# Patient Record
Sex: FEMALE | Race: WHITE | NOT HISPANIC OR LATINO | Employment: UNEMPLOYED | URBAN - METROPOLITAN AREA
[De-identification: names, ages, dates, MRNs, and addresses within clinical notes are randomized per-mention and may not be internally consistent; named-entity substitution may affect disease eponyms.]

---

## 2021-05-06 ENCOUNTER — PATIENT MESSAGE (OUTPATIENT)
Dept: RESEARCH | Facility: HOSPITAL | Age: 46
End: 2021-05-06

## 2021-05-19 ENCOUNTER — OFFICE VISIT (OUTPATIENT)
Dept: URGENT CARE | Facility: CLINIC | Age: 46
End: 2021-05-19
Payer: MEDICAID

## 2021-05-19 VITALS
WEIGHT: 150 LBS | TEMPERATURE: 99 F | HEART RATE: 86 BPM | DIASTOLIC BLOOD PRESSURE: 83 MMHG | BODY MASS INDEX: 24.99 KG/M2 | RESPIRATION RATE: 15 BRPM | HEIGHT: 65 IN | SYSTOLIC BLOOD PRESSURE: 126 MMHG | OXYGEN SATURATION: 98 %

## 2021-05-19 DIAGNOSIS — H00.025 HORDEOLUM INTERNUM OF LEFT LOWER EYELID: Primary | ICD-10-CM

## 2021-05-19 PROCEDURE — 99203 OFFICE O/P NEW LOW 30 MIN: CPT | Mod: S$GLB,,, | Performed by: NURSE PRACTITIONER

## 2021-05-19 PROCEDURE — 99203 PR OFFICE/OUTPT VISIT, NEW, LEVL III, 30-44 MIN: ICD-10-PCS | Mod: S$GLB,,, | Performed by: NURSE PRACTITIONER

## 2021-05-19 RX ORDER — OFLOXACIN 3 MG/ML
1 SOLUTION/ DROPS OPHTHALMIC 4 TIMES DAILY
Qty: 5 ML | Refills: 0 | Status: SHIPPED | OUTPATIENT
Start: 2021-05-19 | End: 2021-05-29

## 2022-10-25 PROBLEM — N94.6 SEVERE DYSMENORRHEA: Status: ACTIVE | Noted: 2022-10-25

## 2024-01-10 DIAGNOSIS — M79.641 RIGHT HAND PAIN: Primary | ICD-10-CM

## 2024-01-10 NOTE — PROGRESS NOTES
We received referral for patient to be seen for right hand pain. She agreed to appointment on 2/15/23 at 11:00 AM. Xrays are ordered, pt aware to go to hospital 30 minutes prior to appointment for xrays.

## 2024-03-27 ENCOUNTER — OFFICE VISIT (OUTPATIENT)
Dept: ORTHOPEDICS | Facility: CLINIC | Age: 49
End: 2024-03-27
Payer: MEDICAID

## 2024-03-27 ENCOUNTER — HOSPITAL ENCOUNTER (OUTPATIENT)
Dept: RADIOLOGY | Facility: HOSPITAL | Age: 49
Discharge: HOME | End: 2024-03-27
Attending: PHYSICIAN ASSISTANT
Payer: MEDICAID

## 2024-03-27 VITALS
BODY MASS INDEX: 26.38 KG/M2 | HEIGHT: 65 IN | DIASTOLIC BLOOD PRESSURE: 84 MMHG | OXYGEN SATURATION: 99 % | WEIGHT: 158.31 LBS | SYSTOLIC BLOOD PRESSURE: 126 MMHG | RESPIRATION RATE: 16 BRPM

## 2024-03-27 DIAGNOSIS — M79.641 RIGHT HAND PAIN: Primary | ICD-10-CM

## 2024-03-27 DIAGNOSIS — M79.641 RIGHT HAND PAIN: ICD-10-CM

## 2024-03-27 DIAGNOSIS — M21.241 FLEXION DEFORMITY OF FINGER JOINT OF RIGHT HAND: ICD-10-CM

## 2024-03-27 PROCEDURE — 99213 OFFICE O/P EST LOW 20 MIN: CPT | Mod: PBBFAC,25 | Performed by: PHYSICIAN ASSISTANT

## 2024-03-27 PROCEDURE — 73130 X-RAY EXAM OF HAND: CPT | Mod: 26,RT,, | Performed by: RADIOLOGY

## 2024-03-27 PROCEDURE — 73130 X-RAY EXAM OF HAND: CPT | Mod: TC,RT

## 2024-03-27 PROCEDURE — 99999 PR PBB SHADOW E&M-EST. PATIENT-LVL III: CPT | Mod: PBBFAC,,, | Performed by: PHYSICIAN ASSISTANT

## 2024-03-27 PROCEDURE — 3074F SYST BP LT 130 MM HG: CPT | Mod: CPTII,,, | Performed by: PHYSICIAN ASSISTANT

## 2024-03-27 PROCEDURE — 1159F MED LIST DOCD IN RCRD: CPT | Mod: CPTII,,, | Performed by: PHYSICIAN ASSISTANT

## 2024-03-27 PROCEDURE — 3079F DIAST BP 80-89 MM HG: CPT | Mod: CPTII,,, | Performed by: PHYSICIAN ASSISTANT

## 2024-03-27 PROCEDURE — 99213 OFFICE O/P EST LOW 20 MIN: CPT | Mod: S$PBB,,, | Performed by: PHYSICIAN ASSISTANT

## 2024-03-27 PROCEDURE — 1160F RVW MEDS BY RX/DR IN RCRD: CPT | Mod: CPTII,,, | Performed by: PHYSICIAN ASSISTANT

## 2024-03-27 PROCEDURE — 3008F BODY MASS INDEX DOCD: CPT | Mod: CPTII,,, | Performed by: PHYSICIAN ASSISTANT

## 2024-03-27 RX ORDER — CETIRIZINE HYDROCHLORIDE 10 MG/1
10 TABLET ORAL
COMMUNITY
Start: 2023-11-29

## 2024-03-27 RX ORDER — ERGOCALCIFEROL 1.25 MG/1
50000 CAPSULE ORAL
COMMUNITY
Start: 2024-03-04

## 2024-03-27 NOTE — PROGRESS NOTES
Subjective:      Patient ID: Morelia Donis is a 48 y.o. female.    Chief Complaint: Pain of the Right Hand    Review of patient's allergies indicates:   Allergen Reactions    Bee sting [allergen ext-venom-honey bee] Swelling     Swelling of the Throat      47 yo F presents to clinic with c/o right hand pain and deformity x years.  No recent injury/trauma.  She reports that she fell in 2007 while holding glass baking dish, dish broke and lacerated her hand.  She reports that she severed several nerves and tendons and had surgery at [facility].  Since then she has had continued pain, numbness/tingling, and deformity to her hand.  She says that she has not had any treatment for this since 2007 when she completed OT.  She feels that her hand is getting stiffer and deformity getting worse.  Numbness/tingling to thumb, index finger, and middle finger, worse at night and when she wakes up in the morning.  Takes ibuprofen as needed with some relief of sx.  She says that she was told by [facility] that she would need additional surgery on her hand, asking for another opinion.        Review of Systems   Constitutional: Negative for chills, diaphoresis and fever.   HENT:  Negative for congestion, ear discharge and ear pain.    Eyes:  Negative for blurred vision, discharge, double vision and pain.   Cardiovascular:  Negative for chest pain, claudication and cyanosis.   Respiratory:  Negative for cough, hemoptysis and shortness of breath.    Endocrine: Negative for cold intolerance and heat intolerance.   Skin:  Negative for color change, dry skin, itching and rash.   Musculoskeletal:  Positive for joint pain. Negative for arthritis, back pain, falls, gout, joint swelling, muscle weakness and neck pain.   Gastrointestinal:  Negative for abdominal pain and change in bowel habit.   Neurological:  Positive for numbness and paresthesias. Negative for brief paralysis, disturbances in coordination and dizziness.    Psychiatric/Behavioral:  Negative for altered mental status and depression.          Objective:          General    Constitutional: She is oriented to person, place, and time. She appears well-developed and well-nourished. No distress.   HENT:   Head: Atraumatic.   Eyes: EOM are normal. Right eye exhibits no discharge. Left eye exhibits no discharge.   Cardiovascular:  Normal rate.            Pulmonary/Chest: Effort normal. No respiratory distress.   Abdominal: Soft.   Neurological: She is alert and oriented to person, place, and time.   Psychiatric: She has a normal mood and affect. Her behavior is normal.             Right Hand/Wrist Exam     Inspection   Scars: Wrist - absent Hand -  present  Effusion: Wrist - absent Hand -  absent  Bruising: Wrist - absent Hand -  absent  Deformity: Wrist - deformity Hand -  deformity    Range of Motion     Wrist   Extension:  abnormal   Flexion:  abnormal   Pronation:  normal   Supination:  normal     Tests   Phalens sign: positive  Tinel's sign (median nerve): positive  Finkelstein's test: negative  Carpal Tunnel Compression Test: positive  Cubital Tunnel Compression Test: negative      Other     Neuorologic Exam    Median Distribution: abnormal (decreased sensation compared to left)  Ulnar Distribution: normal  Radial Distribution: normal      Left Hand/Wrist Exam     Inspection   Scars: Wrist - absent Hand -  absent  Effusion: Wrist - absent Hand -  absent  Bruising: Wrist - absent Hand -  absent  Deformity: Wrist - absent Hand -  absent    Range of Motion     Wrist   Extension:  normal   Flexion:  normal   Pronation:  normal   Supination:  normal     Tests   Phalens Sign: negative  Tinel's sign (median nerve): negative  Finkelstein's test: negative  Carpal Tunnel Compression Test: negative  Cubital Tunnel Compression Test: negative      Other     Sensory Exam  Median Distribution: normal  Ulnar Distribution: normal  Radial Distribution: normal      Right Elbow Exam      Tests   Tinel's sign (cubital tunnel): negative      Left Elbow Exam     Tests   Tinel's sign (cubital tunnel): negative        Vascular Exam       Capillary Refill  Right Hand: normal capillary refill  Left Hand: normal capillary refill                  Assessment:         Xray Right Hand 3/27/24:  Evaluation of the digits is somewhat limited as the fingers are flexed. No displaced fractures identified. No evidence of lytic or blastic lesions.Joint spaces are unremarkable.Soft tissues are unremarkable.     Encounter Diagnoses   Name Primary?    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 Pt would like to proceed with OT and EMG at this time. She would also like to see Dr. Blair for a surgical second opinion.    EMG BUE.  Referral to OT.  Continue ibuprofen as directed as needed.  Appointment with Dr. Blair.    Patient voices understanding of and agreement with treatment plan. All of the patient's questions were answered, and the patient will contact us if she has any questions or concerns in the interim.

## 2024-04-08 ENCOUNTER — CLINICAL SUPPORT (OUTPATIENT)
Dept: REHABILITATION | Facility: HOSPITAL | Age: 49
End: 2024-04-08
Payer: MEDICAID

## 2024-04-08 DIAGNOSIS — R29.898 FINE MOTOR IMPAIRMENT: ICD-10-CM

## 2024-04-08 DIAGNOSIS — R29.818 FINE MOTOR IMPAIRMENT: ICD-10-CM

## 2024-04-08 DIAGNOSIS — M25.641 DECREASED RANGE OF MOTION OF FINGER OF RIGHT HAND: ICD-10-CM

## 2024-04-08 DIAGNOSIS — M79.641 RIGHT HAND PAIN: ICD-10-CM

## 2024-04-08 DIAGNOSIS — M21.241 FLEXION DEFORMITY OF FINGER JOINT OF RIGHT HAND: ICD-10-CM

## 2024-04-08 DIAGNOSIS — M79.641 HAND PAIN, RIGHT: Primary | ICD-10-CM

## 2024-04-08 DIAGNOSIS — R29.898 DECREASED GRIP STRENGTH: ICD-10-CM

## 2024-04-08 PROCEDURE — 97166 OT EVAL MOD COMPLEX 45 MIN: CPT | Mod: PN

## 2024-04-08 NOTE — PLAN OF CARE
"  [facility]  Occupational Therapy Initial Evaluation    Date: 4/8/2024  Name: Morelia Donis  Clinic Number: [id number]    Therapy Diagnosis:   Encounter Diagnoses   Name Primary?    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     Hand pain, right Yes    Decreased  strength     Fine motor impairment     Decreased range of motion of finger of right hand      Physician: Pauly Rivera PA-C    Physician Orders: OT Evaluate and treat  Medical Diagnosis: right hand pain and flexion contracture of right fingers  Surgical Procedure and Date: Flexor tendon repair   Evaluation Date: 4/8/2024  Insurance Authorization Period Expiration: 3/27/2024 - 3/27/2025  Plan of Care Certification Period: 4/8/2024 - 6/8/2024  Progress Note Due: 5/8/2024   Date of Return to MD: 5/22/2024  Visit # / Visits authorized: Evaluation  FOTO: See media section    Time In:10:15  Time Out: 11:00  Total Appointment Time (timed & untimed codes): 45 minutes    SUBJECTIVE     Date of Onset: 2008 - 2009    History of Current Condition/Mechanism of Injury: Morelia reports: She slipped on broken glass and landed on her right arm. Resultant laceration to Flexor tendons noted. She eventually had repair done at [facility] and participated in OT for ~1 year following surgery. She reports she did regain some function back to hand but never reached "100%" and being discharged from therapy due to meeting full potential. Over last several years Pt reports experiencing increased pain to hand with reduced range of motion to fingers. Pain largely at scar site at proximal hypothenar eminence with noticeable digit contracture. Increased ulnar drift of wrist also reported by Pt within last year. She reports currently having very limited functional use of right hand with hand randomly giving out during the day, frequently dropping things.     Falls: None    Involved Side: right   Dominant Side: Right  Imaging: " "X-ray     FINDINGS:  Evaluation of the digits is somewhat limited as the fingers are flexed.  No displaced fractures identified.  No evidence of lytic or blastic lesions.Joint spaces are unremarkable.Soft tissues are unremarkable.     Impression:     No evidence of fracture.No significant degenerative changes.    Prior Therapy: OT at [facility] in 2010 for ~1 year.  Occupation: Unemployed  Working presently: unemployed    Functional Limitations/Social History:    Previous functional status includes: Independent with all ADLs.     Current Functional Status   Home/Living environment: lives with their family      Limitation of Functional Status as follows:   ADLs/IADLs:     - Feeding: MOD I    - Bathing: MOD I    - Dressing/Grooming: MOD I    - Driving: MOD I    Pain:  Functional Pain Scale Rating 0-10: Current 7/10, worst 10/10, best 7/10   Location: Dorsal hand and digits of 4th and 5th. Numbness to 3rd digit. Constant pain noted to volar metacarpal phalangeal to hypothenar to proximal wrist.  Description: Aching, Dull, Burning, Sharp, and Shooting  Aggravating Factors: Night Time, Morning, Extension, Flexing, and Lifting  Easing Factors: pain medication    Patient's Goals for Therapy: "My hand usage back."    Medical History:   Past Medical History:   Diagnosis Date    Anxiety     Cervical cancer     Drug abuse     DUB (dysfunctional uterine bleeding)     Sinus drainage        Surgical History:    has a past surgical history that includes Tubal ligation; Clavicle surgery (Left); Tendon repair (Right); Cold knife conization of cervix; Repair of laceration (Right); Hysteroscopy with dilation and curettage of uterus (N/A, 10/25/2022); and Endometrial ablation (N/A, 10/25/2022).    Medications:   has a current medication list which includes the following prescription(s): albuterol, cetirizine, diazepam, ergocalciferol, ferrous sulfate, loratadine, multivitamin, and fish oil-omega-3 fatty acids.    Allergies:   Review of " patient's allergies indicates:   Allergen Reactions    Bee sting [allergen ext-venom-honey bee] Swelling     Swelling of the Throat          OBJECTIVE     Active Range of Motion:  (Measured in degrees)   Right Left   Wrist Extension  60 80   Wrist Flexion 80 80   Radial Deviation Neutral 35   Ulnar Deviation 60 (resting ulnar drift) 40     Range of Motion Hand  (Active/Passive)  Measured in degrees.   4/8/2024    Right       Digit 2:  MP  85/50 lag                 PIP     95/65                 DIP 40/neutral   Digit 3:  MP 85/50 lag                 PIP 90/50                 DIP 25/neutral   Digit 4:   MP 85/40 lag                  PIP 90/35                  DIP Neutral/neutral   Digit 5:  MP 85/25 lag                 PIP 65/35                 DIP 40/neutral       CMC:    MP 80/                 IP 65/     Manual Muscle Test   Right Left   Wrist Extension  5/5 5/5   Wrist Flexion 5/5 5/5   Radial Deviation 5/5 5/5   Ulnar Deviation 5/5 5/5        Strength (Dynamometer/Measured in pounds on Rung II) and Pinch Strength (Pinch Gauge)   4/8/2024 4/8/2024    Right Left   Composite 20 50   Lateral Pinch 4 13   Tripod Pinch 2 11     Opposition (Fine Motor Skills/Dexterity):   9 hole   - right: 32.83   -:left 18.23  - Reduced digit opposition and fine motor control due to limited finger extension and ulnar drift.     Wound/Scar management: Pt remains with noticeable scar tissue and adhesion with scar to palm moving proximally through hypothenar to ulnar side of wrist and into forearm.     Sensation: Able to consistently identify 3.61 filament to volar and dorsal bilateral upper extremity.       Sensation:   4/8/2024    Right   Deep pressure Intact   Light touch Intact       Edema. Measured in centimeters.   4/8/2024 4/8/2024    Right Left   Wrist Crease 16.3 16.5   Palm/hypothenar 18.7 20.5        Limitation/Restriction for FOTO Survey    Therapist reviewed FOTO scores for Morelia Donis on 4/8/2024.   FOTO  documents entered into EPIC - see Media section.    Limitation Score: See media section         Patient Education and Home Exercises      Education provided:     Patient/Family Education: role of OT, goals for OT, scheduling/cancellations - pt verbalized understanding. Discussed insurance limitations with patient.      ASSESSMENT     Morelia Donis is a 48 y.o. female referred to outpatient occupational therapy and presents with a medical diagnosis of right hand pain with flexion contracture of digits. Patient presents with the following therapy deficits: Decreased ROM, Decreased  strength, Decreased pinch strength, Decreased muscle strength, Decreased functional hand use, Increased pain, Joint Stiffness, and Scar Adhesions and demonstrates limitations as described in the chart below. Following medical record review it is determined that pt will benefit from occupational therapy services in order to maximize pain free and/or functional use of right hand. The following goals were discussed with the patient and patient is in agreement with them as to be addressed in the treatment plan. The patient's rehab potential is Fair.     Anticipated barriers to occupational therapy: None  Pt has no cultural, educational or language barriers to learning provided.    Profile and History Assessment of Occupational Performance Level of Clinical Decision Making Complexity Score   Occupational Profile:   Morelia Donis is a 48 y.o. female who lives with their family and is unemployed Morelia Donis has difficulty with  ADLs and IADLs as listed previously, which  Affecting herdaily functional abilities.      Comorbidities:    has a past medical history of Anxiety, Cervical cancer, Drug abuse, DUB (dysfunctional uterine bleeding), and Sinus drainage.    Medical and Therapy History Review:   Expanded               Performance Deficits    Physical:  Joint Mobility  Muscle Power/Strength  Skin Integrity/Scar  Formation  Control of Voluntary Movement   Strength  Pinch Strength  Fine Motor Coordination  Pain    Cognitive:  No Deficits    Psychosocial:    No Deficits     Clinical Decision Making:  moderate    Assessment Process:  Detailed Assessments    Modification/Need for Assistance:  Minimal-Moderate Modifications/Assistance    Intervention Selection:  Several Treatment Options       moderate  Based on PMHX, co morbidities , data from assessments and functional level of assistance required with task and clinical presentation directly impacting function.       The following goals were discussed with the patient and patient is in agreement with them as to be addressed in the treatment plan.       Goals:   The following goals were discussed with the patient and patient is in agreement with them as to be addressed in the treatment plan.     Short Term Goals (STGs); to be met within 4 weeks.   STG #1: Pt will report 7 out of 10 pain level at worst In right upper extremity.  STG #2: Pt will demonstrate increased extension ROM of 10 degrees to all digits in order increase functional use of UE  STG #3: Pt will demonstrate increased  strength of 10#s in order to increase fine motor skills and dexterity.  STG #4: Pt will complete the 9 peg strength test in order to improve fine motor dexterity with reduction in 5 seconds.   STG #5: Pt will demonstrate independence with issued HEP.     Long Term Goals (LTGs); to be met by discharge.  LTG #1: Pt will report a pain level of 4 out of 10 at worst in right upper extremity.   LTG #2: Pt will demo improved FOTO score to predicted level.  LTG #3: Pt will return to prior level of function for ADL.   LTG #4: Pt will demonstrate increased  strength of 15#s in order to increase fine motor skills and dexterity.  LTG #5: Pt will demonstrate increased extension ROM of 15 degrees to all digits in order increase functional use of UE  PLAN   Plan of Care Certification: 4/8/2024 to  6/8/2024.     Outpatient Occupational Therapy 1 - 2 times weekly for 8 weeks to include the following interventions: Paraffin, Fluidotherapy, Manual therapy/joint mobilizations, Modalities for pain management, US 3 mhz, Therapeutic exercises/activities., Strengthening, and Scar Management.      Nacho Castro OT      I CERTIFY THE NEED FOR THESE SERVICES FURNISHED UNDER THIS PLAN OF TREATMENT AND WHILE UNDER MY CARE  Physician's comments:      Physician's Signature: ___________________________________________________

## 2024-04-11 ENCOUNTER — CLINICAL SUPPORT (OUTPATIENT)
Dept: REHABILITATION | Facility: HOSPITAL | Age: 49
End: 2024-04-11
Payer: MEDICAID

## 2024-04-11 DIAGNOSIS — M79.641 HAND PAIN, RIGHT: Primary | ICD-10-CM

## 2024-04-11 DIAGNOSIS — R29.898 DECREASED GRIP STRENGTH: ICD-10-CM

## 2024-04-11 PROCEDURE — 97530 THERAPEUTIC ACTIVITIES: CPT | Mod: PN

## 2024-04-11 NOTE — PROGRESS NOTES
"  [facility]  Occupational Therapy Treatment Note    Date: 4/11/2024  Name: Morelia Donis  Clinic Number: [id number]    Therapy Diagnosis:   Encounter Diagnoses   Name Primary?    Hand pain, right Yes    Decreased  strength        Physician: Pauly Rivera PA-C       Physician Orders: OT Evaluate and treat  Medical Diagnosis: right hand pain and flexion contracture of right fingers  Surgical Procedure and Date: Flexor tendon repair   Evaluation Date: 4/8/2024  Insurance Authorization Period Expiration: 4/11/2024 - 12/31/2024  Plan of Care Certification Period: 4/8/2024 - 6/8/2024  Progress Note Due: 5/8/2024   Date of Return to MD: 5/22/2024       Visit # / Visits authorized:    1 / 20      Precautions:  none    Time In: 12:00 pm  Time Out: 12:45 pm  Total Billable Time: 45 minutes    SUBJECTIVE     Pt reports:  " Im still having shooting pains in my arm" per pt  She was compliant with home exercise program given last session.     Response to previous treatment:       Pain: /10 at rest     /10 with functional use  Location: right arms, hands , and wrists      OBJECTIVE    Measurements updated at progress report unless specified.    Treatment     Morelia received the treatments listed below:     Supervised modalities after being cleared for contradictions for 8 minutes to include:    -  MHP with parrafin bath x 8 min to (R) wrist and hand to provided circumferential heat to improve soft tissue stretch ad pliability of joints with session.    Direct contact modalities after being cleared for contraindications for 15 minutes (with set up) to include:    - Ultrasound at 50 % 3.3 MHZ 1.2 cm2 to (R) extensor digitorum to relax and improve stretch and reduce contractures to (R) digits 2-5   - Ultrasound at 100 % 3.3 MHZ 1.3 cm2 to (R) volar scar areas of the hypothenar and lateral wrist region to reduce scar adhesion and tissue to increased wrist joint mobility and increased " functional use of (R)UE    Therapeutic activities to improve functional performance for 22 minutes to include:   - prayer stretches for wrist extension 1x10   - white ball opposition and intrinsic strength 3x5   - educated on pink therapy putty exercises ad completed as follows with  -Flexion  -intrinsic and pinch strength  -digit extension   - velcro dowels for supination.pronation strength 3x5   - red digiflex strength composite   - pinch clip yellow 3x10   - education on HEP      Patient Education and Home Exercises      Education provided:   - theraputty and wrist mobility exercises  - Progress towards goals     Written Home Exercises Provided: yes.  Exercises were reviewed and Morelia was able to demonstrate them prior to the end of the session.  Morelia demonstrated good  understanding of the HEP provided. See EMR under Patient Instructions for exercises provided during therapy sessions.       Assessment     Morelia tolerated her session very well today with no increased pian reported. She continues with extension contractures and decreased strength and dexterity limiting functional use of her (R) hand . Morelia is progressing well towards her goals and there are no updates to goals at this time. Pt prognosis is Good. Pt will continue to benefit from skilled outpatient occupational therapy to address the deficits listed in the problem list on initial evaluation provide pt/family education and to maximize pt's level of independence in the home and community environment. Pt's spiritual, cultural and educational needs considered and pt agreeable to plan of care and goals.    Anticipated barriers to occupational therapy: none    Goals:   The following goals were discussed with the patient and patient is in agreement with them as to be addressed in the treatment plan.      Short Term Goals (STGs); to be met within 4 weeks.   STG #1: Pt will report 7 out of 10 pain level at worst In right upper extremity.  STG #2:  Pt will demonstrate increased extension ROM of 10 degrees to all digits in order increase functional use of UE  STG #3: Pt will demonstrate increased  strength of 10#s in order to increase fine motor skills and dexterity.  STG #4: Pt will complete the 9 peg strength test in order to improve fine motor dexterity with reduction in 5 seconds.   STG #5: Pt will demonstrate independence with issued HEP.      Long Term Goals (LTGs); to be met by discharge.  LTG #1: Pt will report a pain level of 4 out of 10 at worst in right upper extremity.     LTG #2: Pt will demo improved FOTO score to predicted level.  LTG #3: Pt will return to prior level of function for ADL.   LTG #4: Pt will demonstrate increased  strength of 15#s in order to increase fine motor skills and dexterity.  LTG #5: Pt will demonstrate increased extension ROM of 15 degrees to all digits in order increase functional use of UE    PLAN   Plan of Care Certification: 4/8/2024 to 6/8/2024.      Outpatient Occupational Therapy 1 - 2 times weekly for 8 weeks to include the following interventions: Paraffin, Fluidotherapy, Manual therapy/joint mobilizations, Modalities for pain management, US 3 mhz, Therapeutic exercises/activities., Strengthening, and Scar Management.       Sameera Noriega, OT

## 2024-04-15 ENCOUNTER — CLINICAL SUPPORT (OUTPATIENT)
Dept: REHABILITATION | Facility: HOSPITAL | Age: 49
End: 2024-04-15
Payer: MEDICAID

## 2024-04-15 DIAGNOSIS — R29.898 DECREASED GRIP STRENGTH: ICD-10-CM

## 2024-04-15 DIAGNOSIS — M79.641 HAND PAIN, RIGHT: Primary | ICD-10-CM

## 2024-04-15 PROCEDURE — 97530 THERAPEUTIC ACTIVITIES: CPT | Mod: PN

## 2024-04-15 NOTE — PROGRESS NOTES
"  [facility]  Occupational Therapy Treatment Note    Date: 4/15/2024  Name: Morelia Donis  Clinic Number: [id number]    Therapy Diagnosis:   Encounter Diagnoses   Name Primary?    Hand pain, right Yes    Decreased  strength        Physician: Pauly Rivera PA-C       Physician Orders: OT Evaluate and treat  Medical Diagnosis: right hand pain and flexion contracture of right fingers  Surgical Procedure and Date: Flexor tendon repair   Evaluation Date: 4/8/2024  Insurance Authorization Period Expiration: 4/11/2024 - 12/31/2024  Plan of Care Certification Period: 4/8/2024 - 6/8/2024  Progress Note Due: 5/8/2024   Date of Return to MD: 5/22/2024       Visit # / Visits authorized:    2 / 20      Precautions:  none    Time In: 9:30 pm  Time Out: 10:30 pm  Total Billable Time: 60 minutes    SUBJECTIVE     Pt reports:  " Im okay" per pt  She was compliant with home exercise program given last session.     Response to previous treatment:       Pain: /10 at rest     /10 with functional use  Location: right arms, hands , and wrists      OBJECTIVE    Measurements updated at progress report unless specified.    Treatment     Morelia received the treatments listed below:     Supervised modalities after being cleared for contradictions for minutes to include: (deferred)   -  MHP with parrafin bath x 8 min to (R) wrist and hand to provided circumferential heat to improve soft tissue stretch ad pliability of joints with session.    Direct contact modalities after being cleared for contraindications for  minutes (with set up) to include: (deferred)   - Ultrasound at 50 % 3.3 MHZ 1.2 cm2 to (R) extensor digitorum to relax and improve stretch and reduce contractures to (R) digits 2-5   - Ultrasound at 100 % 3.3 MHZ 1.3 cm2 to (R) volar scar areas of the hypothenar and lateral wrist region to reduce scar adhesion and tissue to increased wrist joint mobility and increased functional use of " (R)UE    Therapeutic activities to improve functional performance for  minutes to include: (deferred)   - prayer stretches for wrist extension 1x10   - white ball opposition and intrinsic strength 3x5   - educated on pink therapy putty exercises ad completed as follows with  -Flexion  -intrinsic and pinch strength  -digit extension   - velcro dowels for supination.pronation strength 3x5   - red digiflex strength composite   - pinch clip yellow 3x10   - education on HEP     Splint Fabrication  and Ortho Fit and training for 60 minutes to include:   - Patient was fabricated a static orthotic splint to provide prolonged extension stretch to (R) wrist and digits 2-5. A resting hand style with addition finger separator and position piece was added to allow for resting flexion and extension of all digits and thumb.  Also separator provide for prevention of skin breakdown, ulna nerve palsy contractures, and wrist deviation. Prolong stretch is needed to assist in proper position for functional use of her (R) writ and hand.  Pt was educated on wearing schedule and precaution for splint and how to notice skin breakdown areas of any.  She was educated on wearing schedule and to remove splint after 2 hours and let therapist know is any reddened areas remain after 30 min. She stated (I) of precautions, wearing and cleaning schedule.        Patient Education and Home Exercises      Education provided:   - theraputty and wrist mobility exercises  - Progress towards goals     Written Home Exercises Provided: yes.  Exercises were reviewed and Morelia was able to demonstrate them prior to the end of the session.  Morelia demonstrated good  understanding of the HEP provided. See EMR under Patient Instructions for exercises provided during therapy sessions.       Assessment     Morelia tolerated her session very well today  She continues with extension contractures and decreased strength and dexterity limiting functional use of her (R)  hand .She was (I) with her splint precautions, wearing schedule, and doff/don splint.  Morelia is progressing well towards her goals and there are no updates to goals at this time. Pt prognosis is Good. Pt will continue to benefit from skilled outpatient occupational therapy to address the deficits listed in the problem list on initial evaluation provide pt/family education and to maximize pt's level of independence in the home and community environment. Pt's spiritual, cultural and educational needs considered and pt agreeable to plan of care and goals.    Anticipated barriers to occupational therapy: none    Goals:   The following goals were discussed with the patient and patient is in agreement with them as to be addressed in the treatment plan.      Short Term Goals (STGs); to be met within 4 weeks.   STG #1: Pt will report 7 out of 10 pain level at worst In right upper extremity.  STG #2: Pt will demonstrate increased extension ROM of 10 degrees to all digits in order increase functional use of UE  STG #3: Pt will demonstrate increased  strength of 10#s in order to increase fine motor skills and dexterity.  STG #4: Pt will complete the 9 peg strength test in order to improve fine motor dexterity with reduction in 5 seconds.   STG #5: Pt will demonstrate independence with issued HEP.      Long Term Goals (LTGs); to be met by discharge.  LTG #1: Pt will report a pain level of 4 out of 10 at worst in right upper extremity.     LTG #2: Pt will demo improved FOTO score to predicted level.  LTG #3: Pt will return to prior level of function for ADL.   LTG #4: Pt will demonstrate increased  strength of 15#s in order to increase fine motor skills and dexterity.  LTG #5: Pt will demonstrate increased extension ROM of 15 degrees to all digits in order increase functional use of UE    PLAN   Plan of Care Certification: 4/8/2024 to 6/8/2024.      Outpatient Occupational Therapy 1 - 2 times weekly for 8 weeks to  include the following interventions: Paraffin, Fluidotherapy, Manual therapy/joint mobilizations, Modalities for pain management, US 3 mhz, Therapeutic exercises/activities., Strengthening, and Scar Management.       Sameera Noriega, OT

## 2024-04-18 ENCOUNTER — CLINICAL SUPPORT (OUTPATIENT)
Dept: REHABILITATION | Facility: HOSPITAL | Age: 49
End: 2024-04-18
Payer: MEDICAID

## 2024-04-18 DIAGNOSIS — M79.641 HAND PAIN, RIGHT: Primary | ICD-10-CM

## 2024-04-18 DIAGNOSIS — R29.898 DECREASED GRIP STRENGTH: ICD-10-CM

## 2024-04-18 PROCEDURE — 97530 THERAPEUTIC ACTIVITIES: CPT | Mod: PN

## 2024-04-18 NOTE — PROGRESS NOTES
"  [facility]  Occupational Therapy Treatment Note    Date: 4/18/2024  Name: Morelia Donis  Clinic Number: [id number]    Therapy Diagnosis:   Encounter Diagnoses   Name Primary?    Hand pain, right Yes    Decreased  strength        Physician: Pauly Rivera PA-C       Physician Orders: OT Evaluate and treat  Medical Diagnosis: right hand pain and flexion contracture of right fingers  Surgical Procedure and Date: Flexor tendon repair   Evaluation Date: 4/8/2024  Insurance Authorization Period Expiration: 4/11/2024 - 12/31/2024  Plan of Care Certification Period: 4/8/2024 - 6/8/2024  Progress Note Due: 5/8/2024   Date of Return to MD: 5/22/2024       Visit # / Visits authorized:    3   /  20      Precautions:  none    Time In: 9:30 pm  Time Out: 10:30 pm  Total Billable Time: 60 minutes    SUBJECTIVE     Pt reports:  " the brace is great. Just has a little spot on bottom that needs a little padding" per pt  She was compliant with home exercise program given last session.     Response to previous treatment:       Pain: /10 at rest     /10 with functional use  Location: right arms, hands , and wrists      OBJECTIVE    Measurements updated at progress report unless specified.    Treatment     Morelia received the treatments listed below:     Supervised modalities after being cleared for contradictions for minutes to include: (deferred)   -  MHP with parrafin bath x 8 min to (R) wrist and hand to provided circumferential heat to improve soft tissue stretch ad pliability of joints with session.    Direct contact modalities after being cleared for contraindications for 8  minutes (with set up) to include:    - Ultrasound at 50 % 3.3 MHZ 1.2 cm2 to (R) extensor digitorum to relax and improve stretch and reduce contractures to (R) digits 2-5   - Ultrasound at 100 % 3.3 MHZ 1.3 cm2 to (R) volar scar areas of the hypothenar and lateral wrist region to reduce scar adhesion and tissue to " increased wrist joint mobility and increased functional use of (R)UE    Therapeutic activities to improve functional performance for  32 minutes to include: (deferred)  **exercises competed with a functional grasp wrist wrap to prevention of lateral lag with all digits  2-5 and ulnar deviation   - isometric exercises all wrist planes 3x5   - isometric exercise  (R) digits 2-5 PIP extension    - white ball opposition and intrinsic strength 3x10    - velcro dowels for supination.pronation strength 3x5 (deferred)   - green digiflex strength composite   - lateral pinch clip red 3x10   - white ball intrinsic strength 3x10         Splint Fabrication  and Ortho Fit and training for 5 minutes to include:   Patient was fabricated a static orthotic splint to provide prolonged extension stretch to (R) wrist and digits 2-5. A resting hand style with addition finger separator and position piece was added to allow for resting flexion and extension of all digits and thumb.  Also separator provide for prevention of skin breakdown, ulna nerve palsy contractures, and wrist deviation. Prolong stretch is needed to assist in proper position for functional use of her (R) writ and hand.  Pt was educated on wearing schedule and precaution for splint and how to notice skin breakdown areas of any.  She was educated on wearing schedule and to remove splint after 2 hours and let therapist know is any reddened areas remain after 30 min. She stated (I) of precautions, wearing and cleaning schedule.   - Today's visit: pt was given some padding for future areas needed. Also she was given education on proper cleaning of splint materials and precaution        Patient Education and Home Exercises      Education provided:   - theraputty and wrist mobility exercises  - Progress towards goals     Written Home Exercises Provided: yes.  Exercises were reviewed and Morelia was able to demonstrate them prior to the end of the session.  Morelia demonstrated  good  understanding of the HEP provided. See EMR under Patient Instructions for exercises provided during therapy sessions.       Assessment     Morelia tolerated her session very well today  She continues with extension contractures and decreased strength and dexterity limiting functional use of her (R) hand; however some improvements have been noted since her splint fabrication.  She reports no complaint or issues with her splint today and continues to be (I) with wearing schedule.  Morelia is progressing well towards her goals and there are no updates to goals at this time. Pt prognosis is Good. Pt will continue to benefit from skilled outpatient occupational therapy to address the deficits listed in the problem list on initial evaluation provide pt/family education and to maximize pt's level of independence in the home and community environment. Pt's spiritual, cultural and educational needs considered and pt agreeable to plan of care and goals.    Anticipated barriers to occupational therapy: none    Goals:   The following goals were discussed with the patient and patient is in agreement with them as to be addressed in the treatment plan.      Short Term Goals (STGs); to be met within 4 weeks.   STG #1: Pt will report 7 out of 10 pain level at worst In right upper extremity.  STG #2: Pt will demonstrate increased extension ROM of 10 degrees to all digits in order increase functional use of UE  STG #3: Pt will demonstrate increased  strength of 10#s in order to increase fine motor skills and dexterity.  STG #4: Pt will complete the 9 peg strength test in order to improve fine motor dexterity with reduction in 5 seconds.   STG #5: Pt will demonstrate independence with issued HEP.      Long Term Goals (LTGs); to be met by discharge.  LTG #1: Pt will report a pain level of 4 out of 10 at worst in right upper extremity.     LTG #2: Pt will demo improved FOTO score to predicted level.  LTG #3: Pt will return to  prior level of function for ADL.   LTG #4: Pt will demonstrate increased  strength of 15#s in order to increase fine motor skills and dexterity.  LTG #5: Pt will demonstrate increased extension ROM of 15 degrees to all digits in order increase functional use of UE    PLAN   Plan of Care Certification: 4/8/2024 to 6/8/2024.      Outpatient Occupational Therapy 1 - 2 times weekly for 8 weeks to include the following interventions: Paraffin, Fluidotherapy, Manual therapy/joint mobilizations, Modalities for pain management, US 3 mhz, Therapeutic exercises/activities., Strengthening, and Scar Management.       Sameera Noriega, OT

## 2024-04-25 ENCOUNTER — CLINICAL SUPPORT (OUTPATIENT)
Dept: REHABILITATION | Facility: HOSPITAL | Age: 49
End: 2024-04-25
Payer: MEDICAID

## 2024-04-25 DIAGNOSIS — R29.898 DECREASED GRIP STRENGTH: ICD-10-CM

## 2024-04-25 DIAGNOSIS — M79.641 HAND PAIN, RIGHT: Primary | ICD-10-CM

## 2024-04-25 PROCEDURE — 97530 THERAPEUTIC ACTIVITIES: CPT | Mod: PN

## 2024-04-30 ENCOUNTER — CLINICAL SUPPORT (OUTPATIENT)
Dept: REHABILITATION | Facility: HOSPITAL | Age: 49
End: 2024-04-30
Payer: MEDICAID

## 2024-04-30 DIAGNOSIS — R29.898 DECREASED GRIP STRENGTH: ICD-10-CM

## 2024-04-30 DIAGNOSIS — M79.641 HAND PAIN, RIGHT: Primary | ICD-10-CM

## 2024-04-30 PROCEDURE — 97530 THERAPEUTIC ACTIVITIES: CPT | Mod: PN

## 2024-04-30 NOTE — PROGRESS NOTES
"  [facility]  Occupational Therapy Treatment Note    Date: 4/30/2024  Name: Morelia Donis  Clinic Number: [id number]    Therapy Diagnosis:   Encounter Diagnoses   Name Primary?    Hand pain, right Yes    Decreased  strength        Physician: Pauly Rivera PA-C       Physician Orders: OT Evaluate and treat  Medical Diagnosis: right hand pain and flexion contracture of right fingers  Surgical Procedure and Date: Flexor tendon repair   Evaluation Date: 4/8/2024  Insurance Authorization Period Expiration: 4/11/2024 - 12/31/2024  Plan of Care Certification Period: 4/8/2024 - 6/8/2024  Progress Note Due: 5/8/2024   Date of Return to MD: 5/22/2024       Visit # / Visits authorized:    4 / 20      Precautions:  none    Time In: 10:15 am  Time Out: 11:00 am  Total Billable Time: 45 minutes    SUBJECTIVE     Pt reports:  " I've been good" per pt  She was compliant with home exercise program given last session.     Response to previous treatment:       Pain: /10 at rest     /10 with functional use  Location: right arms, hands , and wrists      OBJECTIVE    Measurements updated at progress report unless specified.    Treatment     Morelia received the treatments listed below:     Supervised modalities after being cleared for contradictions for 8 minutes to include:    -  MHP with parrafin bath x 8 min to (R) wrist and hand to provided circumferential heat to improve soft tissue stretch ad pliability of joints with session.    Direct contact modalities after being cleared for contraindications for 10  minutes (with set up) to include:    - Ultrasound at 50 % 3.3 MHZ 1.2 cm2 to (R) extensor digitorum to relax and improve stretch and reduce contractures to (R) digits 2-5   - Ultrasound at 100 % 3.3 MHZ 1.3 cm2 to (R) volar scar areas of the hypothenar and lateral wrist region to reduce scar adhesion and tissue to increased wrist joint mobility and increased functional use of " (R)UE    Therapeutic activities to improve functional performance for  17 minutes to include:   **exercises competed with a functional grasp wrist wrap to prevention of lateral lag with all digits  2-5 and ulnar deviation   - isometric exercises all wrist planes 3x5 (deferred)   - isometric exercise  (R) digits 2-5 PIP extension    - white ball opposition and intrinsic strength 3x10    - velcro dowels for supination.pronation strength 3x5 (deferred)   - green digiflex strength composite   - lateral pinch clip red 3x10 (deferred)   - red sponge for hook fist strength    - white ball intrinsic strength 3x10      Splint Fabrication  and Ortho Fit and training for 10 minutes to include:   Patient was fabricated a static orthotic splint to provide prolonged extension stretch to (R) wrist and digits 2-5. A resting hand style with addition finger separator and position piece was added to allow for resting flexion and extension of all digits and thumb.  Also separator provide for prevention of skin breakdown, ulna nerve palsy contractures, and wrist deviation. Prolong stretch is needed to assist in proper position for functional use of her (R) writ and hand.  Pt was educated on wearing schedule and precaution for splint and how to notice skin breakdown areas of any.  She was educated on wearing schedule and to remove splint after 2 hours and let therapist know is any reddened areas remain after 30 min. She stated (I) of precautions, wearing and cleaning schedule.   - Today's visit: Pt splint was adjusted to provide increased digit 2-5 extension to all planes. Straps were adjusted and pt continued to report no concerns with splint at this time      Patient Education and Home Exercises      Education provided:   - theraputty and wrist mobility exercises  - Progress towards goals     Written Home Exercises Provided: yes.  Exercises were reviewed and Morelia was able to demonstrate them prior to the end of the session.  Morelia  demonstrated good  understanding of the HEP provided. See EMR under Patient Instructions for exercises provided during therapy sessions.       Assessment     Morelia tolerated her session very well today  with increased strength, dexterity, stability, and functional use of her (R) hand. She has incresed extension of digits 2-5 noted since her splint fabrication.  She reports no complaint or issues with her splint today and continues to be (I) with wearing schedule.  Morelia is progressing well towards her goals and there are no updates to goals at this time. Pt prognosis is Good. Pt will continue to benefit from skilled outpatient occupational therapy to address the deficits listed in the problem list on initial evaluation provide pt/family education and to maximize pt's level of independence in the home and community environment. Pt's spiritual, cultural and educational needs considered and pt agreeable to plan of care and goals.    Anticipated barriers to occupational therapy: none    Goals:   The following goals were discussed with the patient and patient is in agreement with them as to be addressed in the treatment plan.      Short Term Goals (STGs); to be met within 4 weeks.   STG #1: Pt will report 7 out of 10 pain level at worst In right upper extremity.  STG #2: Pt will demonstrate increased extension ROM of 10 degrees to all digits in order increase functional use of UE  STG #3: Pt will demonstrate increased  strength of 10#s in order to increase fine motor skills and dexterity.  STG #4: Pt will complete the 9 peg strength test in order to improve fine motor dexterity with reduction in 5 seconds.   STG #5: Pt will demonstrate independence with issued HEP.      Long Term Goals (LTGs); to be met by discharge.  LTG #1: Pt will report a pain level of 4 out of 10 at worst in right upper extremity.     LTG #2: Pt will demo improved FOTO score to predicted level.  LTG #3: Pt will return to prior level of  function for ADL.   LTG #4: Pt will demonstrate increased  strength of 15#s in order to increase fine motor skills and dexterity.  LTG #5: Pt will demonstrate increased extension ROM of 15 degrees to all digits in order increase functional use of UE    PLAN   Plan of Care Certification: 4/8/2024 to 6/8/2024.      Outpatient Occupational Therapy 1 - 2 times weekly for 8 weeks to include the following interventions: Paraffin, Fluidotherapy, Manual therapy/joint mobilizations, Modalities for pain management, US 3 mhz, Therapeutic exercises/activities., Strengthening, and Scar Management.       Sameera Noriega, OT

## 2024-04-30 NOTE — PROGRESS NOTES
"  [facility]  Occupational Therapy Treatment Note    Date: 4/25/2024  Name: Morelia Donis  Clinic Number: [id number]    Therapy Diagnosis:   Encounter Diagnoses   Name Primary?    Hand pain, right Yes    Decreased  strength        Physician: Pauly Rivera PA-C       Physician Orders: OT Evaluate and treat  Medical Diagnosis: right hand pain and flexion contracture of right fingers  Surgical Procedure and Date: Flexor tendon repair   Evaluation Date: 4/8/2024  Insurance Authorization Period Expiration: 4/11/2024 - 12/31/2024  Plan of Care Certification Period: 4/8/2024 - 6/8/2024  Progress Note Due: 5/8/2024   Date of Return to MD: 5/22/2024       Visit # / Visits authorized:    4 / 20      Precautions:  none    Time In: 11:45 pm  Time Out: 12:30 pm  Total Billable Time: 45 minutes    SUBJECTIVE     Pt reports:  " the splint is good and my hand is already doing better" per pt  She was compliant with home exercise program given last session.     Response to previous treatment:       Pain: /10 at rest     /10 with functional use  Location: right arms, hands , and wrists      OBJECTIVE    Measurements updated at progress report unless specified.    Treatment     Morelia received the treatments listed below:     Supervised modalities after being cleared for contradictions for 8 minutes to include:    -  MHP with x 8 min to (R) wrist and hand to provided circumferential heat to improve soft tissue stretch ad pliability of joints with session.    Direct contact modalities after being cleared for contraindications for 15 minutes (with set up) to include:    - Ultrasound at 50 % 3.3 MHZ 1.2 cm2 to (R) extensor digitorum to relax and improve stretch and reduce contractures to (R) digits 2-5   - Ultrasound at 100 % 3.3 MHZ 1.3 cm2 to (R) volar scar areas of the hypothenar and lateral wrist region to reduce scar adhesion and tissue to increased wrist joint mobility and increased " functional use of (R)UE    Therapeutic activities to improve functional performance for  22 minutes to include:   **exercises competed with a functional grasp wrist wrap to prevention of lateral lag with all digits  2-5 and ulnar deviation   - isometric exercises all wrist planes 3x5   - isometric exercise  (R) digits 2-5 PIP extension    - white ball opposition and intrinsic strength 3x10    - yellow resistance tube for supination.pronation strength 3x5    - green digiflex strength composite   - lateral pinch clip red 3x10   - white ball intrinsic strength 3x10         Splint Fabrication  and Ortho Fit and training for  minutes to include:   Patient was fabricated a static orthotic splint to provide prolonged extension stretch to (R) wrist and digits 2-5. A resting hand style with addition finger separator and position piece was added to allow for resting flexion and extension of all digits and thumb.  Also separator provide for prevention of skin breakdown, ulna nerve palsy contractures, and wrist deviation. Prolong stretch is needed to assist in proper position for functional use of her (R) writ and hand.  Pt was educated on wearing schedule and precaution for splint and how to notice skin breakdown areas of any.  She was educated on wearing schedule and to remove splint after 2 hours and let therapist know is any reddened areas remain after 30 min. She stated (I) of precautions, wearing and cleaning schedule.   - Today's visit:         Patient Education and Home Exercises      Education provided:   - theraputty and wrist mobility exercises  - Progress towards goals     Written Home Exercises Provided: yes.  Exercises were reviewed and Morelia was able to demonstrate them prior to the end of the session.  Morelia demonstrated good  understanding of the HEP provided. See EMR under Patient Instructions for exercises provided during therapy sessions.       Assessment     Morelia tolerated her session very well today   She continues with decreased extension contractures, increased strength and dexterity.  She reports no complaint or issues with her splint today and continues to be (I) with wearing schedule. She had reported increased functional use and support with her (R) wrist and hand. She is noted with decreased ulnar deformity and shift noted since last session. Morelia is progressing well towards her goals and there are no updates to goals at this time. Pt prognosis is Good. Pt will continue to benefit from skilled outpatient occupational therapy to address the deficits listed in the problem list on initial evaluation provide pt/family education and to maximize pt's level of independence in the home and community environment. Pt's spiritual, cultural and educational needs considered and pt agreeable to plan of care and goals.    Anticipated barriers to occupational therapy: none    Goals:   The following goals were discussed with the patient and patient is in agreement with them as to be addressed in the treatment plan.      Short Term Goals (STGs); to be met within 4 weeks.   STG #1: Pt will report 7 out of 10 pain level at worst In right upper extremity.  STG #2: Pt will demonstrate increased extension ROM of 10 degrees to all digits in order increase functional use of UE  STG #3: Pt will demonstrate increased  strength of 10#s in order to increase fine motor skills and dexterity.  STG #4: Pt will complete the 9 peg strength test in order to improve fine motor dexterity with reduction in 5 seconds.   STG #5: Pt will demonstrate independence with issued HEP.      Long Term Goals (LTGs); to be met by discharge.  LTG #1: Pt will report a pain level of 4 out of 10 at worst in right upper extremity.     LTG #2: Pt will demo improved FOTO score to predicted level.  LTG #3: Pt will return to prior level of function for ADL.   LTG #4: Pt will demonstrate increased  strength of 15#s in order to increase fine motor skills  and dexterity.  LTG #5: Pt will demonstrate increased extension ROM of 15 degrees to all digits in order increase functional use of UE    PLAN   Plan of Care Certification: 4/8/2024 to 6/8/2024.      Outpatient Occupational Therapy 1 - 2 times weekly for 8 weeks to include the following interventions: Paraffin, Fluidotherapy, Manual therapy/joint mobilizations, Modalities for pain management, US 3 mhz, Therapeutic exercises/activities., Strengthening, and Scar Management.       Sameera Noriega, OT

## 2024-05-06 ENCOUNTER — CLINICAL SUPPORT (OUTPATIENT)
Dept: REHABILITATION | Facility: HOSPITAL | Age: 49
End: 2024-05-06
Payer: MEDICAID

## 2024-05-06 DIAGNOSIS — R29.898 DECREASED GRIP STRENGTH: ICD-10-CM

## 2024-05-06 DIAGNOSIS — M79.641 HAND PAIN, RIGHT: Primary | ICD-10-CM

## 2024-05-06 DIAGNOSIS — M25.641 DECREASED RANGE OF MOTION OF FINGER OF RIGHT HAND: ICD-10-CM

## 2024-05-06 DIAGNOSIS — R29.818 FINE MOTOR IMPAIRMENT: ICD-10-CM

## 2024-05-06 DIAGNOSIS — R29.898 FINE MOTOR IMPAIRMENT: ICD-10-CM

## 2024-05-06 DIAGNOSIS — M21.241 FLEXION DEFORMITY OF FINGER JOINT OF RIGHT HAND: ICD-10-CM

## 2024-05-06 PROCEDURE — 97530 THERAPEUTIC ACTIVITIES: CPT | Mod: PN

## 2024-05-06 NOTE — PROGRESS NOTES
"  [facility]  Occupational Therapy Treatment Note    Date: 5/6/2024  Name: Morelia Donis  Clinic Number: [id number]    Therapy Diagnosis:   Encounter Diagnoses   Name Primary?    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     Decreased range of motion of finger of right hand        Physician: Pauly Rivera PA-C       Physician Orders: OT Evaluate and treat  Medical Diagnosis: right hand pain and flexion contracture of right fingers  Surgical Procedure and Date: Flexor tendon repair   Evaluation Date: 4/8/2024  Insurance Authorization Period Expiration: 4/11/2024 - 12/31/2024  Plan of Care Certification Period: 4/8/2024 - 6/8/2024  Progress Note Due: 5/8/2024   Date of Return to MD: 5/22/2024       Visit # / Visits authorized:    6 / 20      Precautions:  none    Time In: 11:00 am  Time Out: 11:45 am  Total Billable Time: 41 minutes    SUBJECTIVE     Pt reports:  "I'm seeing some positive change."  She was compliant with home exercise program given last session.     Response to previous treatment:       Pain: /10 at rest     /10 with functional use  Location: right arms, hands , and wrists      OBJECTIVE   Updated 5/6/2024    Active Range of Motion:  (Measured in degrees)    Right Right    Wrist Extension  60 60   Wrist Flexion 80 85   Radial Deviation Neutral 15   Ulnar Deviation 60 (resting ulnar drift) 60      Range of Motion Hand  (Active/Passive)  Measured in degrees.    4/8/2024      Right Right           Digit 2:  MP  85/50 lag 90/15                 PIP     95/65 95/55                 DIP 40/neutral 45/0   Digit 3:  MP 85/50 lag 90/20                 PIP 90/50 90/50                 DIP 25/neutral 25/0   Digit 4:   MP 85/40 lag 85/20                  PIP 90/35 90/30                  DIP Neutral/neutral Neutral/0   Digit 5:  MP 85/25 lag 90/20                 PIP 65/35 70/30                 " DIP 40/neutral 45/Nuetral      Manual Muscle Test    Right Right    Wrist Extension  5/5 5/5   Wrist Flexion 5/5 5/5   Radial Deviation 5/5 5/5   Ulnar Deviation 5/5 5/5          Strength (Dynamometer/Measured in pounds on Rung II) and Pinch Strength (Pinch Gauge)    4/8/2024 4/8/2024     Right Right    Composite 20 25   Lateral Pinch 4 6   Tripod Pinch 2 5      Opposition (Fine Motor Skills/Dexterity):   9 hole              - right: 32.83 > 27.90        Treatment     Morelia received the treatments listed below:     Supervised modalities after being cleared for contradictions for 8 minutes to include:    -  MHP with parrafin bath x 8 min to (R) wrist and hand to provided circumferential heat to improve soft tissue stretch ad pliability of joints with session.    Direct contact modalities after being cleared for contraindications for 10  minutes (with set up) to include:    - Ultrasound at 50 % 3.3 MHZ 1.2 cm2 to (R) extensor digitorum to relax and improve stretch and reduce contractures to (R) digits 2-5    - Ultrasound at 100 % 3.3 MHZ 1.3 cm2 to (R) volar scar areas of the hypothenar and lateral wrist region to reduce scar adhesion and tissue to increased wrist joint mobility and increased functional use of (R)UE    Therapeutic activities to improve functional performance for  23 minutes to include:   - Scar massage and suction applied to hypothenar and ulnar sided wrist scar to break up scar tissue and adhesions for improved range of motion and tendon excursion  - passive range of motion stretch to digits 1-5 in extension with prolonged end range hold   - passive range of motion stretch to wrist flexion/extension and radial/ulnar deviation with extended end range hold.   - Updated measurements      Splint Fabrication  and Ortho Fit and training for - minutes to include:   Patient was fabricated a static orthotic splint to provide prolonged extension stretch to (R) wrist and digits 2-5. A resting hand style  with addition finger separator and position piece was added to allow for resting flexion and extension of all digits and thumb.  Also separator provide for prevention of skin breakdown, ulna nerve palsy contractures, and wrist deviation. Prolong stretch is needed to assist in proper position for functional use of her (R) writ and hand.  Pt was educated on wearing schedule and precaution for splint and how to notice skin breakdown areas of any.  She was educated on wearing schedule and to remove splint after 2 hours and let therapist know is any reddened areas remain after 30 min. She stated (I) of precautions, wearing and cleaning schedule.   - Today's visit: Pt did not bring splint to visit today. Pt instructed to bring splint to next visit for adjustment.      Patient Education and Home Exercises      Education provided:   - theraputty and wrist mobility exercises  - Progress towards goals     Written Home Exercises Provided: yes.  Exercises were reviewed and Morelia was able to demonstrate them prior to the end of the session.  Morelia demonstrated good  understanding of the HEP provided. See EMR under Patient Instructions for exercises provided during therapy sessions.       Assessment     Morelia tolerated her session very well today. Upon re-measurement Pt demonstrating improvements in range of motion to digits and wrist with improvement in hand strength as documented above. She reports noticing increased range of motion with use of right upper extremity during daily tasks with praise of current progressive extension splint.  Morelia is progressing well towards her goals and there are no updates to goals at this time. Pt prognosis is Good. Pt will continue to benefit from skilled outpatient occupational therapy to address the deficits listed in the problem list on initial evaluation provide pt/family education and to maximize pt's level of independence in the home and community environment. Pt's spiritual,  cultural and educational needs considered and pt agreeable to plan of care and goals.    Anticipated barriers to occupational therapy: none    Goals:   The following goals were discussed with the patient and patient is in agreement with them as to be addressed in the treatment plan.      Short Term Goals (STGs); to be met within 4 weeks.   STG #1: Pt will report 7 out of 10 pain level at worst In right upper extremity. (Progressing)   STG #2: Pt will demonstrate increased extension ROM of 10 degrees to all digits in order increase functional use of upper extremity (Progressing)  STG #3: Pt will demonstrate increased  strength of 10#s in order to increase fine motor skills and dexterity. (Progressing)   STG #4: Pt will complete the 9 peg strength test in order to improve fine motor dexterity with reduction in 5 seconds. (MET)  STG #5: Pt will demonstrate independence with issued HEP. (MET)     Long Term Goals (LTGs); to be met by discharge.  LTG #1: Pt will report a pain level of 4 out of 10 at worst in right upper extremity.     LTG #2: Pt will demo improved FOTO score to predicted level. (MET)  LTG #3: Pt will return to prior level of function for ADL.   LTG #4: Pt will demonstrate increased  strength of 15#s in order to increase fine motor skills and dexterity.  LTG #5: Pt will demonstrate increased extension ROM of 15 degrees to all digits in order increase functional use of UE    PLAN   Plan of Care Certification: 4/8/2024 to 6/8/2024.      Outpatient Occupational Therapy 1 - 2 times weekly for 8 weeks to include the following interventions: Paraffin, Fluidotherapy, Manual therapy/joint mobilizations, Modalities for pain management, US 3 mhz, Therapeutic exercises/activities., Strengthening, and Scar Management.       Nacho Castro, OT

## 2024-05-13 ENCOUNTER — CLINICAL SUPPORT (OUTPATIENT)
Dept: REHABILITATION | Facility: HOSPITAL | Age: 49
End: 2024-05-13
Payer: MEDICAID

## 2024-05-13 DIAGNOSIS — M79.641 HAND PAIN, RIGHT: Primary | ICD-10-CM

## 2024-05-13 DIAGNOSIS — R29.898 DECREASED GRIP STRENGTH: ICD-10-CM

## 2024-05-13 PROCEDURE — 97530 THERAPEUTIC ACTIVITIES: CPT | Mod: PN

## 2024-05-13 NOTE — PROGRESS NOTES
"  [facility]  Occupational Therapy Treatment Note    Date: 5/13/2024  Name: Morelia Donis  Clinic Number: [id number]    Therapy Diagnosis:   Encounter Diagnoses   Name Primary?    Hand pain, right Yes    Decreased  strength        Physician: Pauly Rivera PA-C       Physician Orders: OT Evaluate and treat  Medical Diagnosis: right hand pain and flexion contracture of right fingers  Surgical Procedure and Date: Flexor tendon repair   Evaluation Date: 4/8/2024  Insurance Authorization Period Expiration: 4/11/2024 - 12/31/2024  Plan of Care Certification Period: 4/8/2024 - 6/8/2024  Progress Note Due: 5/8/2024   Date of Return to MD: 5/22/2024       Visit # / Visits authorized:    7 / 20      Precautions:  none    Time In: 10:15 am  Time Out: 11:00 am  Total Billable Time: 45 minutes    SUBJECTIVE     Pt reports:  "Its been doing well, but today I feel the front coming. It wanting claw up." Per pt  She was compliant with home exercise program given last session.     Response to previous treatment:       Pain: /10 at rest     /10 with functional use  Location: right arms, hands , and wrists      OBJECTIVE   Updated 5/6/2024    Active Range of Motion:  (Measured in degrees)    Right Right    Wrist Extension  60 60   Wrist Flexion 80 85   Radial Deviation Neutral 15   Ulnar Deviation 60 (resting ulnar drift) 60      Range of Motion Hand  (Active/Passive)  Measured in degrees.    4/8/2024      Right Right           Digit 2:  MP  85/50 lag 90/15                 PIP     95/65 95/55                 DIP 40/neutral 45/0   Digit 3:  MP 85/50 lag 90/20                 PIP 90/50 90/50                 DIP 25/neutral 25/0   Digit 4:   MP 85/40 lag 85/20                  PIP 90/35 90/30                  DIP Neutral/neutral Neutral/0   Digit 5:  MP 85/25 lag 90/20                 PIP 65/35 70/30                 DIP 40/neutral 45/Nuetral      Manual Muscle Test    Right Right    Wrist Extension  " 5/5 5/5   Wrist Flexion 5/5 5/5   Radial Deviation 5/5 5/5   Ulnar Deviation 5/5 5/5          Strength (Dynamometer/Measured in pounds on Rung II) and Pinch Strength (Pinch Gauge)    4/8/2024 4/8/2024     Right Right    Composite 20 25   Lateral Pinch 4 6   Tripod Pinch 2 5      Opposition (Fine Motor Skills/Dexterity):   9 hole              - right: 32.83 > 27.90        Treatment     Morelia received the treatments listed below:     Supervised modalities after being cleared for contradictions for 8 minutes to include:    -  MHP with parrafin bath x 8 min to (R) wrist and hand to provided circumferential heat to improve soft tissue stretch ad pliability of joints with session.    Direct contact modalities after being cleared for contraindications for 8  minutes (with set up) to include:    - Ultrasound at 50 % 3.3 MHZ 1.2 cm2 to (R) extensor digitorum to relax and improve stretch and reduce contractures to (R) digits 2-5 (deferred)   - Ultrasound at 100 % 3.3 MHZ 1.3 cm2 to (R) volar scar areas of the hypothenar and lateral wrist region to reduce scar adhesion and tissue to increased wrist joint mobility and increased functional use of (R)UE    Therapeutic activities to improve functional performance for  21 minutes to include:    - tendon gliding 3x10   - blocking exercise 3x10 of digits 1-5 in extension with prolonged end range    - red football intrinsic strength 3x10   - opposition white ball 3x10   - supination/pronation yellow resistance tube   - green digiflex composite 3x10   - passive range of motion stretch to wrist flexion/extension and radial/ulnar deviation with extended end range hold.        Splint Fabrication  and Ortho Fit and training for - 8 minutes to include:   Patient was fabricated a static orthotic splint to provide prolonged extension stretch to (R) wrist and digits 2-5. A resting hand style with addition finger separator and position piece was added to allow for resting flexion and  extension of all digits and thumb.  Also separator provide for prevention of skin breakdown, ulna nerve palsy contractures, and wrist deviation. Prolong stretch is needed to assist in proper position for functional use of her (R) writ and hand.  Pt was educated on wearing schedule and precaution for splint and how to notice skin breakdown areas of any.  She was educated on wearing schedule and to remove splint after 2 hours and let therapist know is any reddened areas remain after 30 min. She stated (I) of precautions, wearing and cleaning schedule.   - Today's visit:  increased extension and prolong stretch to -10 degrees extension and altered straps, cleaned and trimmed off access of finger speratot to allow for increased digit 5 adduction      Patient Education and Home Exercises      Education provided:   - theraputty and wrist mobility exercises  - Progress towards goals     Written Home Exercises Provided: yes.  Exercises were reviewed and Morelia was able to demonstrate them prior to the end of the session.  Morelia demonstrated good  understanding of the HEP provided. See EMR under Patient Instructions for exercises provided during therapy sessions.       Assessment     Morelia tolerated her session very well today. Upon re-measurement Pt demonstrating improvements in range of motion to digits and wrist with improvement in hand strength as documented above. She reports noticing increased range of motion with use of right upper extremity during daily tasks with praise of current progressive extension splint.  Morelia is progressing well towards her goals and there are no updates to goals at this time. Pt prognosis is Good. Pt will continue to benefit from skilled outpatient occupational therapy to address the deficits listed in the problem list on initial evaluation provide pt/family education and to maximize pt's level of independence in the home and community environment. Pt's spiritual, cultural and  educational needs considered and pt agreeable to plan of care and goals.    Anticipated barriers to occupational therapy: none    Goals:   The following goals were discussed with the patient and patient is in agreement with them as to be addressed in the treatment plan.      Short Term Goals (STGs); to be met within 4 weeks.   STG #1: Pt will report 7 out of 10 pain level at worst In right upper extremity. (Progressing)   STG #2: Pt will demonstrate increased extension ROM of 10 degrees to all digits in order increase functional use of upper extremity (Progressing)  STG #3: Pt will demonstrate increased  strength of 10#s in order to increase fine motor skills and dexterity. (Progressing)   STG #4: Pt will complete the 9 peg strength test in order to improve fine motor dexterity with reduction in 5 seconds. (MET)  STG #5: Pt will demonstrate independence with issued HEP. (MET)     Long Term Goals (LTGs); to be met by discharge.  LTG #1: Pt will report a pain level of 4 out of 10 at worst in right upper extremity.     LTG #2: Pt will demo improved FOTO score to predicted level. (MET)  LTG #3: Pt will return to prior level of function for ADL.   LTG #4: Pt will demonstrate increased  strength of 15#s in order to increase fine motor skills and dexterity.  LTG #5: Pt will demonstrate increased extension ROM of 15 degrees to all digits in order increase functional use of UE    PLAN   Plan of Care Certification: 4/8/2024 to 6/8/2024.      Outpatient Occupational Therapy 1 - 2 times weekly for 8 weeks to include the following interventions: Paraffin, Fluidotherapy, Manual therapy/joint mobilizations, Modalities for pain management, US 3 mhz, Therapeutic exercises/activities., Strengthening, and Scar Management.       Sameera Noriega, OT

## 2024-05-16 ENCOUNTER — CLINICAL SUPPORT (OUTPATIENT)
Dept: REHABILITATION | Facility: HOSPITAL | Age: 49
End: 2024-05-16
Payer: MEDICAID

## 2024-05-16 DIAGNOSIS — R29.898 DECREASED GRIP STRENGTH: Primary | ICD-10-CM

## 2024-05-16 PROCEDURE — 97530 THERAPEUTIC ACTIVITIES: CPT | Mod: PN

## 2024-05-16 NOTE — PROGRESS NOTES
"  [facility]  Occupational Therapy Treatment Note    Date: 5/16/2024  Name: Morelia Donis  Clinic Number: [id number]    Therapy Diagnosis:   No diagnosis found.      Physician: Pauly Rivera PA-C       Physician Orders: OT Evaluate and treat  Medical Diagnosis: right hand pain and flexion contracture of right fingers  Surgical Procedure and Date: Flexor tendon repair   Evaluation Date: 4/8/2024  Insurance Authorization Period Expiration: 4/11/2024 - 12/31/2024  Plan of Care Certification Period: 4/8/2024 - 6/8/2024  Progress Note Due: 5/8/2024   Date of Return to MD: 5/22/2024       Visit # / Visits authorized:    8 / 20      Precautions:  none    Time In: 10:00 am  Time Out: 11:00 am  Total Billable Time: 60 minutes    SUBJECTIVE     Pt reports:  "Its much better than last time." Per pt  She was compliant with home exercise program given last session.     Response to previous treatment:       Pain: /10 at rest     /10 with functional use  Location: right arms, hands , and wrists      OBJECTIVE   Updated 5/6/2024    Active Range of Motion:  (Measured in degrees)    Right Right    Wrist Extension  60 60   Wrist Flexion 80 85   Radial Deviation Neutral 15   Ulnar Deviation 60 (resting ulnar drift) 60      Range of Motion Hand  (Active/Passive)  Measured in degrees.    4/8/2024      Right Right           Digit 2:  MP  85/50 lag 90/15                 PIP     95/65 95/55                 DIP 40/neutral 45/0   Digit 3:  MP 85/50 lag 90/20                 PIP 90/50 90/50                 DIP 25/neutral 25/0   Digit 4:   MP 85/40 lag 85/20                  PIP 90/35 90/30                  DIP Neutral/neutral Neutral/0   Digit 5:  MP 85/25 lag 90/20                 PIP 65/35 70/30                 DIP 40/neutral 45/Nuetral      Manual Muscle Test    Right Right    Wrist Extension  5/5 5/5   Wrist Flexion 5/5 5/5   Radial Deviation 5/5 5/5   Ulnar Deviation 5/5 5/5          Strength " (Dynamometer/Measured in pounds on Rung II) and Pinch Strength (Pinch Gauge)    4/8/2024 4/8/2024     Right Right    Composite 20 25   Lateral Pinch 4 6   Tripod Pinch 2 5      Opposition (Fine Motor Skills/Dexterity):   9 hole              - right: 32.83 > 27.90        Treatment     Morelia received the treatments listed below:     Supervised modalities after being cleared for contradictions for 8 minutes to include:    -  MHP with x 8 min to (R) wrist and hand to provided circumferential heat to improve soft tissue stretch ad pliability of joints with session.    Direct contact modalities after being cleared for contraindications for   minutes (with set up) to include:  (deferred)   - Ultrasound at 50 % 3.3 MHZ 1.2 cm2 to (R) extensor digitorum to relax and improve stretch and reduce contractures to (R) digits 2-5 (deferred)   - Ultrasound at 100 % 3.3 MHZ 1.3 cm2 to (R) volar scar areas of the hypothenar and lateral wrist region to reduce scar adhesion and tissue to increased wrist joint mobility and increased functional use of (R)UE    Therapeutic activities to improve functional performance for  10 minutes to include:    - tendon gliding 3x10 (deferred)   - blocking exercise 3x10 of digits 1-5 in extension with prolonged end range    - red football intrinsic strength 3x10   - opposition white ball 3x10   - supination/pronation yellow resistance tube (deferred)   - green digiflex composite 3x10    - passive range of motion stretch to wrist flexion/extension and radial/ulnar deviation with extended end range hold.  (deferred)       Splint Fabrication  and Ortho Fit and training for  42 minutes to include:   Patient was fabricated a static orthotic splint to provide wrist correction and alignment with functional use and support of her  (R) wrist and digits 2-5. Pt was instructed to continue with using her resting hand style with addition finger separator resting flexion and extension of all digits and thumb to be  worn at night only and to use the functional splint during the day.  She was also noted to continue with using her functional splint for 2-3 hours at a time to gradually tolerated correction positioning.  Prolong stretch is needed to assist in proper position for functional use of her (R) writ and hand.  Pt was educated on wearing schedule and precaution for splint and how to notice skin breakdown areas of any.  She was educated on wearing schedule and to remove splint after 2 hours and let therapist know is any reddened areas remain after 30 min. She stated (I) of precautions, wearing and cleaning schedule.   - Today's visit: see above    Patient Education and Home Exercises      Education provided:   - theraputty and wrist mobility exercises  - Progress towards goals     Written Home Exercises Provided: yes.  Exercises were reviewed and Morelia was able to demonstrate them prior to the end of the session.  Morelia demonstrated good  understanding of the HEP provided. See EMR under Patient Instructions for exercises provided during therapy sessions.       Assessment     Morelia tolerated her session very well today with improvements in range of motion, hand strength, and coordination as documented above. She reports increased use of right upper extremity during daily tasks.  Morelia is progressing well towards her goals and there are no updates to goals at this time. Pt prognosis is Good. Pt will continue to benefit from skilled outpatient occupational therapy to address the deficits listed in the problem list on initial evaluation provide pt/family education and to maximize pt's level of independence in the home and community environment. Pt's spiritual, cultural and educational needs considered and pt agreeable to plan of care and goals.    Anticipated barriers to occupational therapy: none    Goals:   The following goals were discussed with the patient and patient is in agreement with them as to be addressed in  the treatment plan.      Short Term Goals (STGs); to be met within 4 weeks.   STG #1: Pt will report 7 out of 10 pain level at worst In right upper extremity. (Progressing)   STG #2: Pt will demonstrate increased extension ROM of 10 degrees to all digits in order increase functional use of upper extremity (Progressing)  STG #3: Pt will demonstrate increased  strength of 10#s in order to increase fine motor skills and dexterity. (Progressing)   STG #4: Pt will complete the 9 peg strength test in order to improve fine motor dexterity with reduction in 5 seconds. (MET)  STG #5: Pt will demonstrate independence with issued HEP. (MET)     Long Term Goals (LTGs); to be met by discharge.  LTG #1: Pt will report a pain level of 4 out of 10 at worst in right upper extremity.     LTG #2: Pt will demo improved FOTO score to predicted level. (MET)  LTG #3: Pt will return to prior level of function for ADL.   LTG #4: Pt will demonstrate increased  strength of 15#s in order to increase fine motor skills and dexterity.  LTG #5: Pt will demonstrate increased extension ROM of 15 degrees to all digits in order increase functional use of UE    PLAN   Plan of Care Certification: 4/8/2024 to 6/8/2024.      Outpatient Occupational Therapy 1 - 2 times weekly for 8 weeks to include the following interventions: Paraffin, Fluidotherapy, Manual therapy/joint mobilizations, Modalities for pain management, US 3 mhz, Therapeutic exercises/activities., Strengthening, and Scar Management.       Sameera Noriega, OT

## 2024-05-23 ENCOUNTER — CLINICAL SUPPORT (OUTPATIENT)
Dept: REHABILITATION | Facility: HOSPITAL | Age: 49
End: 2024-05-23
Payer: MEDICAID

## 2024-05-23 DIAGNOSIS — M79.641 HAND PAIN, RIGHT: Primary | ICD-10-CM

## 2024-05-23 DIAGNOSIS — R29.898 DECREASED GRIP STRENGTH: ICD-10-CM

## 2024-05-23 DIAGNOSIS — R29.818 FINE MOTOR IMPAIRMENT: ICD-10-CM

## 2024-05-23 DIAGNOSIS — M25.641 DECREASED RANGE OF MOTION OF FINGER OF RIGHT HAND: ICD-10-CM

## 2024-05-23 DIAGNOSIS — R29.898 FINE MOTOR IMPAIRMENT: ICD-10-CM

## 2024-05-23 DIAGNOSIS — M21.241 FLEXION DEFORMITY OF FINGER JOINT OF RIGHT HAND: ICD-10-CM

## 2024-05-23 PROCEDURE — 97530 THERAPEUTIC ACTIVITIES: CPT | Mod: PN

## 2024-05-23 NOTE — PROGRESS NOTES
[facility]  Occupational Therapy Treatment Note    Date: 5/23/2024  Name: Morelia Donis  Clinic Number: [id number]    Therapy Diagnosis:   Encounter Diagnoses   Name Primary?    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     Decreased range of motion of finger of right hand        Physician: Pauly Rivera PA-C       Physician Orders: OT Evaluate and treat  Medical Diagnosis: right hand pain and flexion contracture of right fingers  Surgical Procedure and Date: Flexor tendon repair   Evaluation Date: 4/8/2024  Insurance Authorization Period Expiration: 4/11/2024 - 12/31/2024  Plan of Care Certification Period: 4/8/2024 - 6/8/2024  Progress Note Due: 5/8/2024   Date of Return to MD: 5/22/2024       Visit # / Visits authorized:    9 / 20      Precautions:  none    Time In: 14:32 am  Time Out: 15:15 am  Total Billable Time: 43 minutes    SUBJECTIVE     Pt reports:  No new complaints   She was compliant with home exercise program given last session.     Response to previous treatment:       Pain: /10 at rest     /10 with functional use  Location: right arms, hands , and wrists      OBJECTIVE   Updated 5/6/2024    Active Range of Motion:  (Measured in degrees)    Right Right    Wrist Extension  60 60   Wrist Flexion 80 85   Radial Deviation Neutral 15   Ulnar Deviation 60 (resting ulnar drift) 60      Range of Motion Hand  (Active/Passive)  Measured in degrees.    4/8/2024      Right Right           Digit 2:  MP  85/50 lag 90/15                 PIP     95/65 95/55                 DIP 40/neutral 45/0   Digit 3:  MP 85/50 lag 90/20                 PIP 90/50 90/50                 DIP 25/neutral 25/0   Digit 4:   MP 85/40 lag 85/20                  PIP 90/35 90/30                  DIP Neutral/neutral Neutral/0   Digit 5:  MP 85/25 lag 90/20                 PIP 65/35 70/30                 DIP 40/neutral  45/Nuetral      Manual Muscle Test    Right Right    Wrist Extension  5/5 5/5   Wrist Flexion 5/5 5/5   Radial Deviation 5/5 5/5   Ulnar Deviation 5/5 5/5          Strength (Dynamometer/Measured in pounds on Rung II) and Pinch Strength (Pinch Gauge)    4/8/2024 4/8/2024     Right Right    Composite 20 25   Lateral Pinch 4 6   Tripod Pinch 2 5      Opposition (Fine Motor Skills/Dexterity):   9 hole              - right: 32.83 > 27.90        Treatment     Morelia received the treatments listed below:     Supervised modalities after being cleared for contradictions for 8 minutes to include:    -  Paraffin Wax with x 8 min to (R) wrist and hand to provided circumferential heat to improve soft tissue stretch ad pliability of joints with session.    Direct contact modalities after being cleared for contraindications for  10 minutes (with set up) to include:  (deferred)   - Ultrasound at 50 % 3.3 MHZ 1.2 cm2 to (R) extensor digitorum to relax and improve stretch and reduce contractures to (R) digits 2-5 (deferred)   - Ultrasound at 100 % 3.3 MHZ 1.3 cm2 to (R) volar scar areas of the hypothenar and lateral wrist region to reduce scar adhesion and tissue to increased wrist joint mobility and increased functional use of (R)UE   - Ultrasound at 50 % 3.3 MHZ 1.2 cm2 to (R) volar wrist at flexor tendons with passive digit extension to relax and improve stretch and reduce contractures to (R) digits 2-5     Therapeutic Exericses to improve functional performance for  25 minutes to include:   - passive range of motion with blue wedge including:   - Wrist flexion   - Wrist extension   - ulnar deviation   - radial deviation  - passive range of motion completed to individual digits with metacarpal phalangeal joints maintained in flexion and proximal interphalangeal and distal interphalangeal flexion/extended as well as metacarpal phalangeal joints extended with flexion/extension of proximal interphalangeal and distal  interphalangeal joints for intrinsic tightness and end range hold.  - Green digiflex composite: 30 reps  - Resisted pronator bar supination/pronation: 2 x 10 1#  - Ulnar/radial deviation: 2 x 10  - tendon gliding 3x10    Patient Education and Home Exercises      Education provided:   - theraputty and wrist mobility exercises  - Progress towards goals     Written Home Exercises Provided: yes.  Exercises were reviewed and Morelia was able to demonstrate them prior to the end of the session.  Morelia demonstrated good  understanding of the HEP provided. See EMR under Patient Instructions for exercises provided during therapy sessions.       Assessment     Morelia tolerated her session very well today. She is reporting noticing improvement to finger and hand range of motion however she continues with pain to hand. Extensive scar tissue remains limiting to range of motion especially around are of hypothenar and medial volar wrist with digit extension.  Morelia is progressing well towards her goals and there are no updates to goals at this time. Pt prognosis is Good. Pt will continue to benefit from skilled outpatient occupational therapy to address the deficits listed in the problem list on initial evaluation provide pt/family education and to maximize pt's level of independence in the home and community environment. Pt's spiritual, cultural and educational needs considered and pt agreeable to plan of care and goals.    Anticipated barriers to occupational therapy: none    Goals:   The following goals were discussed with the patient and patient is in agreement with them as to be addressed in the treatment plan.      Short Term Goals (STGs); to be met within 4 weeks.   STG #1: Pt will report 7 out of 10 pain level at worst In right upper extremity. (Progressing)   STG #2: Pt will demonstrate increased extension ROM of 10 degrees to all digits in order increase functional use of upper extremity (Progressing)  STG #3: Pt  will demonstrate increased  strength of 10#s in order to increase fine motor skills and dexterity. (Progressing)   STG #4: Pt will complete the 9 peg strength test in order to improve fine motor dexterity with reduction in 5 seconds. (MET)  STG #5: Pt will demonstrate independence with issued HEP. (MET)     Long Term Goals (LTGs); to be met by discharge.  LTG #1: Pt will report a pain level of 4 out of 10 at worst in right upper extremity.     LTG #2: Pt will demo improved FOTO score to predicted level. (MET)  LTG #3: Pt will return to prior level of function for ADL.   LTG #4: Pt will demonstrate increased  strength of 15#s in order to increase fine motor skills and dexterity.  LTG #5: Pt will demonstrate increased extension ROM of 15 degrees to all digits in order increase functional use of UE    PLAN   Plan of Care Certification: 4/8/2024 to 6/8/2024.      Outpatient Occupational Therapy 1 - 2 times weekly for 8 weeks to include the following interventions: Paraffin, Fluidotherapy, Manual therapy/joint mobilizations, Modalities for pain management, US 3 mhz, Therapeutic exercises/activities., Strengthening, and Scar Management.       Nacho Castro, OT

## 2024-05-30 ENCOUNTER — CLINICAL SUPPORT (OUTPATIENT)
Dept: REHABILITATION | Facility: HOSPITAL | Age: 49
End: 2024-05-30
Payer: MEDICAID

## 2024-05-30 DIAGNOSIS — M79.641 HAND PAIN, RIGHT: Primary | ICD-10-CM

## 2024-05-30 DIAGNOSIS — R29.898 DECREASED GRIP STRENGTH: ICD-10-CM

## 2024-05-30 PROCEDURE — 97530 THERAPEUTIC ACTIVITIES: CPT | Mod: PN

## 2024-05-30 NOTE — PROGRESS NOTES
[facility]  Occupational Therapy Treatment Note    Date: 5/30/2024  Name: Morelia Donis  Clinic Number: [id number]    Therapy Diagnosis:   Encounter Diagnoses   Name Primary?    Hand pain, right Yes    Decreased  strength        Physician: Pauly Rivera PA-C       Physician Orders: OT Evaluate and treat  Medical Diagnosis: right hand pain and flexion contracture of right fingers  Surgical Procedure and Date: Flexor tendon repair   Evaluation Date: 4/8/2024  Insurance Authorization Period Expiration: 4/11/2024 - 12/31/2024  Plan of Care Certification Period: 4/8/2024 - 6/8/2024  Progress Note Due: 5/8/2024   Date of Return to MD: 5/22/2024       Visit # / Visits authorized:    10  /  20      Precautions:  none    Time In: 2:30 pm  Time Out: 3:15 pm  Total Billable Time: 45 minutes    SUBJECTIVE     Pt reports:  My shoulder and neck has been painful and weak at then end of the day.   She was compliant with home exercise program given last session.     Response to previous treatment:       Pain: /10 at rest     /10 with functional use  Location: right arms, hands , and wrists      OBJECTIVE   Updated 5/6/2024    Active Range of Motion:  (Measured in degrees)    Right Right    Wrist Extension  60 60   Wrist Flexion 80 85   Radial Deviation Neutral 15   Ulnar Deviation 60 (resting ulnar drift) 60      Range of Motion Hand  (Active/Passive)  Measured in degrees.    4/8/2024      Right Right           Digit 2:  MP  85/50 lag 90/15                 PIP     95/65 95/55                 DIP 40/neutral 45/0   Digit 3:  MP 85/50 lag 90/20                 PIP 90/50 90/50                 DIP 25/neutral 25/0   Digit 4:   MP 85/40 lag 85/20                  PIP 90/35 90/30                  DIP Neutral/neutral Neutral/0   Digit 5:  MP 85/25 lag 90/20                 PIP 65/35 70/30                 DIP 40/neutral 45/Nuetral      Manual Muscle Test    Right Right    Wrist Extension  5/5 5/5    Wrist Flexion 5/5 5/5   Radial Deviation 5/5 5/5   Ulnar Deviation 5/5 5/5          Strength (Dynamometer/Measured in pounds on Rung II) and Pinch Strength (Pinch Gauge)    4/8/2024 4/8/2024     Right Right    Composite 20 25   Lateral Pinch 4 6   Tripod Pinch 2 5      Opposition (Fine Motor Skills/Dexterity):   9 hole              - right: 32.83 > 27.90        Treatment     Morelia received the treatments listed below:     Supervised modalities after being cleared for contradictions for 8 minutes to include:    -  Paraffin Wax with x 8 min to (R) wrist and hand to provided circumferential heat to improve soft tissue stretch ad pliability of joints with session.    Direct contact modalities after being cleared for contraindications for  8minutes (with set up) to include:  (deferred)   - Ultrasound at 50 % 3.3 MHZ 1.2 cm2 to (R) extensor digitorum to relax and improve stretch and reduce contractures to (R) digits 2-5 (deferred)   - Ultrasound at 100 % 3.3 MHZ 1.3 cm2 to (R) volar scar areas of the hypothenar and lateral wrist region to reduce scar adhesion and tissue to increased wrist joint mobility and increased functional use of (R)UE   - Ultrasound at 50 % 3.3 MHZ 1.2 cm2 to (R) volar wrist at flexor tendons with passive digit extension to relax and improve stretch and reduce contractures to (R) digits 2-5     Therapeutic Exericses to improve functional performance for  25 minutes to include:   ** Exercises performed with MCP blocked with coban wrapping including  -  isometrics over blue wedge    - Wrist flexion   - Wrist extension   - ulnar deviation   - radial deviation  -white ball opposition   - Green digiflex composite: 30 reps  - Red Resisted pronator bar supination/pronation: 2 x 10   - Ulnar/radial deviation: 2 x 10  - tendon gliding 3x10   - hook fisting isometrics 3x10     Patient Education and Home Exercises      Education provided:   - theraputty and wrist mobility exercises  - Progress towards  goals     Written Home Exercises Provided: yes.  Exercises were reviewed and Morelia was able to demonstrate them prior to the end of the session.  Morelia demonstrated good  understanding of the HEP provided. See EMR under Patient Instructions for exercises provided during therapy sessions.       Assessment     Morelia tolerated her session very well today with continue minimal  improvement to finger and hand range of motion.  She reports pain now progressed to her shoulder and cervical regions as per possible compensating movements. Extensive scar tissue remains limiting to range of motion especially around are of hypothenar and medial volar wrist with digit extension.  Morelia is progressing well towards her goals and there are no updates to goals at this time. Pt prognosis is Good. Pt will continue to benefit from skilled outpatient occupational therapy to address the deficits listed in the problem list on initial evaluation provide pt/family education and to maximize pt's level of independence in the home and community environment. Pt's spiritual, cultural and educational needs considered and pt agreeable to plan of care and goals.    Anticipated barriers to occupational therapy: none    Goals:   The following goals were discussed with the patient and patient is in agreement with them as to be addressed in the treatment plan.      Short Term Goals (STGs); to be met within 4 weeks.   STG #1: Pt will report 7 out of 10 pain level at worst In right upper extremity. (Progressing)   STG #2: Pt will demonstrate increased extension ROM of 10 degrees to all digits in order increase functional use of upper extremity (Progressing)  STG #3: Pt will demonstrate increased  strength of 10#s in order to increase fine motor skills and dexterity. (Progressing)   STG #4: Pt will complete the 9 peg strength test in order to improve fine motor dexterity with reduction in 5 seconds. (MET)  STG #5: Pt will demonstrate  independence with issued HEP. (MET)     Long Term Goals (LTGs); to be met by discharge.  LTG #1: Pt will report a pain level of 4 out of 10 at worst in right upper extremity.     LTG #2: Pt will demo improved FOTO score to predicted level. (MET)  LTG #3: Pt will return to prior level of function for ADL.   LTG #4: Pt will demonstrate increased  strength of 15#s in order to increase fine motor skills and dexterity.  LTG #5: Pt will demonstrate increased extension ROM of 15 degrees to all digits in order increase functional use of UE    PLAN   Plan of Care Certification: 4/8/2024 to 6/8/2024.      Outpatient Occupational Therapy 1 - 2 times weekly for 8 weeks to include the following interventions: Paraffin, Fluidotherapy, Manual therapy/joint mobilizations, Modalities for pain management, US 3 mhz, Therapeutic exercises/activities., Strengthening, and Scar Management.       Sameera Noriega, OT

## 2024-06-05 ENCOUNTER — DOCUMENTATION ONLY (OUTPATIENT)
Dept: REHABILITATION | Facility: HOSPITAL | Age: 49
End: 2024-06-05
Payer: MEDICAID

## 2024-06-05 NOTE — PROGRESS NOTES
Contacted to reschedule appointment however Pt is currently out of town on vacation and will return to therapy next week.

## 2024-06-12 ENCOUNTER — CLINICAL SUPPORT (OUTPATIENT)
Dept: REHABILITATION | Facility: HOSPITAL | Age: 49
End: 2024-06-12
Payer: MEDICAID

## 2024-06-12 DIAGNOSIS — M79.641 HAND PAIN, RIGHT: Primary | ICD-10-CM

## 2024-06-12 DIAGNOSIS — R29.898 DECREASED GRIP STRENGTH: ICD-10-CM

## 2024-06-12 PROCEDURE — 97530 THERAPEUTIC ACTIVITIES: CPT | Mod: PN

## 2024-06-12 NOTE — PROGRESS NOTES
"    [facility]  Occupational Therapy Treatment Note/ POC update    Date: 6/12/2024  Name: Morelia Donis  Clinic Number: [id number]    Therapy Diagnosis:   Encounter Diagnoses   Name Primary?    Hand pain, right Yes    Decreased  strength        Physician: Pauly Rivera PA-C       Physician Orders: OT Evaluate and treat  Medical Diagnosis: right hand pain and flexion contracture of right fingers  Surgical Procedure and Date: Flexor tendon repair   Evaluation Date: 4/8/2024  Insurance Authorization Period Expiration: 4/11/2024 - 12/31/2024  Plan of Care Certification Period: 6/12/2024 - 8/12/2024  Progress Note Due: 7/13/2024  Date of Return to MD: 5/22/2024       Visit # / Visits authorized:    11 / 20      Precautions:  none    Time In: 3:15 pm  Time Out: 4:00pm  Total Billable Time: 41 minutes    SUBJECTIVE     Pt reports: "My hand does feel a little tighter after being off for my vacation."  She was compliant with home exercise program given last session.     Response to previous treatment:       Pain: /10 at rest     /10 with functional use  Location: right arms, hands , and wrists      OBJECTIVE   Updated 5/6/2024    Active Range of Motion:  (Measured in degrees)    Right Right    Wrist Extension  60 60   Wrist Flexion 80 85   Radial Deviation Neutral 15   Ulnar Deviation 60 (resting ulnar drift) 60      Range of Motion Hand  (Active/Passive)  Measured in degrees.    4/8/2024      Right Right           Digit 2:  MP  85/50 lag 90/15                 PIP     95/65 95/55                 DIP 40/neutral 45/0   Digit 3:  MP 85/50 lag 90/20                 PIP 90/50 90/50                 DIP 25/neutral 25/0   Digit 4:   MP 85/40 lag 85/20                  PIP 90/35 90/30                  DIP Neutral/neutral Neutral/0   Digit 5:  MP 85/25 lag 90/20                 PIP 65/35 70/30                 DIP 40/neutral 45/Nuetral      Manual Muscle Test    Right Right    Wrist Extension  " 5/5 5/5   Wrist Flexion 5/5 5/5   Radial Deviation 5/5 5/5   Ulnar Deviation 5/5 5/5          Strength (Dynamometer/Measured in pounds on Rung II) and Pinch Strength (Pinch Gauge)    4/8/2024 4/8/2024     Right Right    Composite 20 25   Lateral Pinch 4 6   Tripod Pinch 2 5      Opposition (Fine Motor Skills/Dexterity):   9 hole              - right: 32.83 > 27.90        Treatment     Morelia received the treatments listed below:     Supervised modalities after being cleared for contradictions for 8 minutes to include:    -  Paraffin Wax with x 8 min to (R) wrist and hand to provided circumferential heat to improve soft tissue stretch ad pliability of joints with session.    Direct contact modalities after being cleared for contraindications for  10 minutes (with set up) to include:  (deferred)   - Ultrasound at 50 % 3.3 MHZ 1.2 cm2 to (R) extensor digitorum to relax and improve stretch and reduce contractures to (R) digits 2-5 (deferred)   - Ultrasound at 100 % 3.3 MHZ 1.3 cm2 to (R) volar scar areas of the hypothenar and lateral wrist region to reduce scar adhesion and tissue to increased wrist joint mobility and increased functional use of (R)upper extremity (5 Minutes)   - Ultrasound at 50 % 3.3 MHZ 1.2 cm2 to (R) volar wrist at flexor tendons with passive digit extension to relax and improve stretch and reduce contractures to (R) digits 2-5 (5 Minutes)    Therapeutic Exericses to improve functional performance for  23 minutes to include:   - Digit blocking   - distal interphalangeal: 50 reps   -proximal interphalangeal: 50 reps  - Passive extension to proximal interphalangeal and distal interphalangeal joints with gradual extension of metacarpal phalangeal joints in extension  - Green resistance bar   - supination/pronation: 30 reps  - flexion/extension 30 reps  - ulnar/radial deviation: 30 reps  - Green sponge opposition 20 reps  - Thumb resistance exercises including:   - radial adduction: 20 reps   -  radial abduction: 20 reps   - palmar abduction: 20 reps   - palmar adduction: 20 reps    Patient Education and Home Exercises      Education provided:   - theraputty and wrist mobility exercises  - Progress towards goals     Written Home Exercises Provided: yes.  Exercises were reviewed and Morelia was able to demonstrate them prior to the end of the session.  Morelia demonstrated good  understanding of the HEP provided. See EMR under Patient Instructions for exercises provided during therapy sessions.       Assessment     Morelia tolerated her session very well today with continued limitations in right hand range of motion extension to digits with resting ulnar deviation. Extensive scar tissue still noted to hypothenar, ulnar wrist, and elbow crease. No reports of shoulder or neck pain today.  Morelia is progressing towards her goals and there are no updates to goals at this time. Pt prognosis is Good. Pt will continue to benefit from skilled outpatient occupational therapy to address the deficits listed in the problem list on initial evaluation provide pt/family education and to maximize pt's level of independence in the home and community environment. Pt's spiritual, cultural and educational needs considered and pt agreeable to plan of care and goals.    Anticipated barriers to occupational therapy: none    Goals:   The following goals were discussed with the patient and patient is in agreement with them as to be addressed in the treatment plan.      Short Term Goals (STGs); to be met within 4 weeks.   STG #1: Pt will report 7 out of 10 pain level at worst In right upper extremity. (Progressing)   STG #2: Pt will demonstrate increased extension ROM of 10 degrees to all digits in order increase functional use of upper extremity (Progressing)  STG #3: Pt will demonstrate increased  strength of 10#s in order to increase fine motor skills and dexterity. (Progressing)   STG #4: Pt will complete the 9 peg strength  test in order to improve fine motor dexterity with reduction in 5 seconds. (MET)  STG #5: Pt will demonstrate independence with issued HEP. (MET)     Long Term Goals (LTGs); to be met by discharge.  LTG #1: Pt will report a pain level of 4 out of 10 at worst in right upper extremity.     LTG #2: Pt will demo improved FOTO score to predicted level. (MET)  LTG #3: Pt will return to prior level of function for ADL.   LTG #4: Pt will demonstrate increased  strength of 15#s in order to increase fine motor skills and dexterity.  LTG #5: Pt will demonstrate increased extension ROM of 15 degrees to all digits in order increase functional use of UE    PLAN   Plan of Care Certification: 4/8/2024 to 6/8/2024.      Outpatient Occupational Therapy 1 - 2 times weekly for 8 weeks to include the following interventions: Paraffin, Fluidotherapy, Manual therapy/joint mobilizations, Modalities for pain management, US 3 mhz, Therapeutic exercises/activities., Strengthening, and Scar Management.       Nacho Castro OT

## 2024-06-14 ENCOUNTER — CLINICAL SUPPORT (OUTPATIENT)
Dept: REHABILITATION | Facility: HOSPITAL | Age: 49
End: 2024-06-14
Payer: MEDICAID

## 2024-06-14 DIAGNOSIS — R29.898 DECREASED GRIP STRENGTH: ICD-10-CM

## 2024-06-14 DIAGNOSIS — M79.641 HAND PAIN, RIGHT: Primary | ICD-10-CM

## 2024-06-14 PROCEDURE — 97530 THERAPEUTIC ACTIVITIES: CPT | Mod: PN

## 2024-06-14 NOTE — PROGRESS NOTES
"    [facility]  Occupational Therapy Treatment Note/ POC update    Date: 6/14/2024  Name: Morelia Donis  Clinic Number: [id number]    Therapy Diagnosis:   Encounter Diagnoses   Name Primary?    Hand pain, right Yes    Decreased  strength        Physician: Pauly Rivera PA-C       Physician Orders: OT Evaluate and treat  Medical Diagnosis: right hand pain and flexion contracture of right fingers  Surgical Procedure and Date: Flexor tendon repair   Evaluation Date: 4/8/2024  Insurance Authorization Period Expiration: 4/11/2024 - 12/31/2024  Plan of Care Certification Period: 6/12/2024 - 8/12/2024  Progress Note Due: 7/13/2024  Date of Return to MD: 5/22/2024       Visit # / Visits authorized:    12 / 20      Precautions:  none    Time In: 9:30 pm  Time Out: 10:15 pm  Total Billable Time: 45 minutes    SUBJECTIVE     Pt reports: "My hand does feel a little tighter after being off for my vacation."  She was compliant with home exercise program given last session.     Response to previous treatment:       Pain: /10 at rest     /10 with functional use  Location: right arms, hands , and wrists      OBJECTIVE   Updated 5/6/2024    Active Range of Motion:  (Measured in degrees)    Right Right  Right    Wrist Extension  60 60 55   Wrist Flexion 80 85 85   Radial Deviation Neutral 15 30   Ulnar Deviation 60 (resting ulnar drift) 60 60      Range of Motion Hand  (Active/Passive)  Measured in degrees.    4/8/2024 6/14/2024     Right Right  Right            Digit 2:  MP  85/50 lag 90/15 90/0                 PIP     95/65 95/55 95/45                 DIP 40/neutral 45/0 40/0   Digit 3:  MP 85/50 lag 90/20 90/20                 PIP 90/50 90/50 95/40                 DIP 25/neutral 25/0 25/0   Digit 4:   MP 85/40 lag 85/20 85/20                  PIP 90/35 90/30 95/30                  DIP Neutral/neutral Neutral/0 25/0   Digit 5:  MP 85/25 lag 90/20 90/15                 PIP 65/35 70/30 70/30 "                 DIP 40/neutral 45/Nuetral 40/0      Manual Muscle Test    Right Right  Right    Wrist Extension  5/5 5/5 5/5   Wrist Flexion 5/5 5/5 5/5   Radial Deviation 5/5 5/5 5/5   Ulnar Deviation 5/5 5/5 5/5          Strength (Dynamometer/Measured in pounds on Rung II) and Pinch Strength (Pinch Gauge)    4/8/2024 6/14/2024     Right Right  Right    Composite 20 25 20   Lateral Pinch 4 6 7   Tripod Pinch 2 5 6      Opposition (Fine Motor Skills/Dexterity):   9 hole              - right: 32.83 > 27.90 > 28.39        Treatment     Morelia received the treatments listed below:     Supervised modalities after being cleared for contradictions for 10 minutes to include:    -  Paraffin Wax with x 8 min to (R) wrist and hand to provided circumferential heat to improve soft tissue stretch ad pliability of joints with session.    Direct contact modalities after being cleared for contraindications for  10 minutes (with set up) to include:  (deferred)   - Ultrasound at 50 % 3.3 MHZ 1.2 cm2 to (R) extensor digitorum to relax and improve stretch and reduce contractures to (R) digits 2-5 (deferred)   - Ultrasound at 100 % 3.3 MHZ 1.3 cm2 to (R) volar scar areas of the hypothenar and lateral wrist region to reduce scar adhesion and tissue to increased wrist joint mobility and increased functional use of (R)upper extremity (5 Minutes)   - Ultrasound at 50 % 3.3 MHZ 1.2 cm2 to (R) volar wrist at flexor tendons with passive digit extension to relax and improve stretch and reduce contractures to (R) digits 2-5 (5 Minutes)    Therapeutic Exericses to improve functional performance for  25 minutes to include:   - Weight bearing through right upper extremity for increased stretch in extension  - Passive extension to proximal interphalangeal and distal interphalangeal joints with gradual extension of metacarpal phalangeal joints in extension  - Digit blocking   - distal interphalangeal: 50 reps   -proximal interphalangeal: 50  reps  - Thumb resistance exercises including:   - radial adduction: 20 reps   - radial abduction: 20 reps   - palmar abduction: 20 reps   - palmar adduction: 20 reps  - Updated measurements   Patient Education and Home Exercises      Education provided:   - theraputty and wrist mobility exercises  - Progress towards goals     Written Home Exercises Provided: yes.  Exercises were reviewed and Morelia was able to demonstrate them prior to the end of the session.  Morelia demonstrated good  understanding of the HEP provided. See EMR under Patient Instructions for exercises provided during therapy sessions.       Assessment     Morelia tolerated her session very well today with continued limitations in right hand range of motion extension to digits with resting ulnar deviation. Upon re-measurement Pt demonstrating improved wrist range of motion with fairly consistent measurements in digit range of motion and strength as documented above. Pt reporting that  following time off from vacation she has experienced some increased tightness to hand and digits wit reported continued compliance with splint and HEP.  Morelia is progressing towards her goals and there are no updates to goals at this time. Pt prognosis is Good. Pt will continue to benefit from skilled outpatient occupational therapy to address the deficits listed in the problem list on initial evaluation provide pt/family education and to maximize pt's level of independence in the home and community environment. Pt's spiritual, cultural and educational needs considered and pt agreeable to plan of care and goals.    Anticipated barriers to occupational therapy: none    Goals:   The following goals were discussed with the patient and patient is in agreement with them as to be addressed in the treatment plan.      Short Term Goals (STGs); to be met within 4 weeks.   STG #1: Pt will report 7 out of 10 pain level at worst In right upper extremity. (Progressing)   STG #2:  Pt will demonstrate increased extension ROM of 10 degrees to all digits in order increase functional use of upper extremity (Progressing)  STG #3: Pt will demonstrate increased  strength of 10#s in order to increase fine motor skills and dexterity. (Progressing)   STG #4: Pt will complete the 9 peg strength test in order to improve fine motor dexterity with reduction in 5 seconds. (MET)  STG #5: Pt will demonstrate independence with issued HEP. (MET)     Long Term Goals (LTGs); to be met by discharge.  LTG #1: Pt will report a pain level of 4 out of 10 at worst in right upper extremity. (Progressing)  LTG #2: Pt will demo improved FOTO score to predicted level. (MET)  LTG #3: Pt will return to prior level of function for ADL. (Progressing)  LTG #4: Pt will demonstrate increased  strength of 15#s in order to increase fine motor skills and dexterity.  LTG #5: Pt will demonstrate increased extension ROM of 15 degrees to all digits in order increase functional use of UE    PLAN   Plan of Care Certification: 6/12/2024 - 8/12/2024     Outpatient Occupational Therapy 1 - 2 times weekly for 8 weeks to include the following interventions: Paraffin, Fluidotherapy, Manual therapy/joint mobilizations, Modalities for pain management, US 3 mhz, Therapeutic exercises/activities., Strengthening, and Scar Management.       Nacho Castro OT

## 2024-06-18 ENCOUNTER — CLINICAL SUPPORT (OUTPATIENT)
Dept: REHABILITATION | Facility: HOSPITAL | Age: 49
End: 2024-06-18
Payer: MEDICAID

## 2024-06-18 DIAGNOSIS — M79.641 HAND PAIN, RIGHT: Primary | ICD-10-CM

## 2024-06-18 DIAGNOSIS — R29.898 DECREASED GRIP STRENGTH: ICD-10-CM

## 2024-06-18 PROCEDURE — 97530 THERAPEUTIC ACTIVITIES: CPT | Mod: PN

## 2024-06-18 NOTE — PROGRESS NOTES
[facility]  Occupational Therapy Treatment Note    Date: 6/18/2024  Name: Morelia Donis  Clinic Number: [id number]    Therapy Diagnosis:   Encounter Diagnoses   Name Primary?    Hand pain, right Yes    Decreased  strength        Physician: Pauly Rivera PA-C       Physician Orders: OT Evaluate and treat  Medical Diagnosis: right hand pain and flexion contracture of right fingers  Surgical Procedure and Date: Flexor tendon repair   Evaluation Date: 4/8/2024  Insurance Authorization Period Expiration: 4/11/2024 - 12/31/2024  Plan of Care Certification Period: 6/12/2024 - 8/12/2024  Progress Note Due: 7/13/2024  Date of Return to MD: 5/22/2024       Visit # / Visits authorized:    13 / 20      Precautions:  none    Time In: 11:15 pm  Time Out: 12:00 pm  Total Billable Time: 45 minutes    SUBJECTIVE     Pt reports: No new complaints   She was compliant with home exercise program given last session.     Response to previous treatment:       Pain: /10 at rest     /10 with functional use  Location: right arms, hands , and wrists      OBJECTIVE   Updated 5/6/2024    Active Range of Motion:  (Measured in degrees)    Right Right  Right    Wrist Extension  60 60 55   Wrist Flexion 80 85 85   Radial Deviation Neutral 15 30   Ulnar Deviation 60 (resting ulnar drift) 60 60      Range of Motion Hand  (Active/Passive)  Measured in degrees.    4/8/2024 6/14/2024     Right Right  Right            Digit 2:  MP  85/50 lag 90/15 90/0                 PIP     95/65 95/55 95/45                 DIP 40/neutral 45/0 40/0   Digit 3:  MP 85/50 lag 90/20 90/20                 PIP 90/50 90/50 95/40                 DIP 25/neutral 25/0 25/0   Digit 4:   MP 85/40 lag 85/20 85/20                  PIP 90/35 90/30 95/30                  DIP Neutral/neutral Neutral/0 25/0   Digit 5:  MP 85/25 lag 90/20 90/15                 PIP 65/35 70/30 70/30                 DIP 40/neutral 45/Nuetral 40/0      Manual  Muscle Test    Right Right  Right    Wrist Extension  5/5 5/5 5/5   Wrist Flexion 5/5 5/5 5/5   Radial Deviation 5/5 5/5 5/5   Ulnar Deviation 5/5 5/5 5/5          Strength (Dynamometer/Measured in pounds on Rung II) and Pinch Strength (Pinch Gauge)    4/8/2024 6/14/2024     Right Right  Right    Composite 20 25 20   Lateral Pinch 4 6 7   Tripod Pinch 2 5 6      Opposition (Fine Motor Skills/Dexterity):   9 hole              - right: 32.83 > 27.90 > 28.39        Treatment     Morelia received the treatments listed below:     Supervised modalities after being cleared for contradictions for 10 minutes to include:    -  Paraffin Wax with x 8 min to (R) wrist and hand to provided circumferential heat to improve soft tissue stretch ad pliability of joints with session.    Direct contact modalities after being cleared for contraindications for  10 minutes (with set up) to include:  (deferred)   - Ultrasound at 50 % 3.3 MHZ 1.2 cm2 to (R) extensor digitorum to relax and improve stretch and reduce contractures to (R) digits 2-5 (deferred)   - Ultrasound at 100 % 3.3 MHZ 1.3 cm2 to (R) volar scar areas of the hypothenar and lateral wrist region to reduce scar adhesion and tissue to increased wrist joint mobility and increased functional use of (R)upper extremity (5 Minutes)   - Ultrasound at 50 % 3.3 MHZ 1.2 cm2 to (R) volar wrist at flexor tendons with passive digit extension to relax and improve stretch and reduce contractures to (R) digits 2-5 (5 Minutes)    Friction massage: Applied to tight wrist extensors to reduce tension and increase relaxation prior to exercises for 5 minutes.     Therapeutic Exericses to improve functional performance for  20 minutes to include:   - Passive extension to proximal interphalangeal and distal interphalangeal joints with gradual extension of metacarpal phalangeal joints in extension  - Digit blocking   - distal interphalangeal: 50 reps   -proximal interphalangeal: 50 reps  - Thumb  resistance exercises including:   - radial adduction: 20 reps   - radial abduction: 20 reps   - palmar abduction: 20 reps   - palmar adduction: 20 reps  - Tan digiweb 2 x 10 each   - metacarpal phalangeal flexion   - metacarpal phalangeal extension   - supination   - pronation    Patient Education and Home Exercises      Education provided:   - theraputty and wrist mobility exercises  - Progress towards goals     Written Home Exercises Provided: yes.  Exercises were reviewed and Morelia was able to demonstrate them prior to the end of the session.  Morelia demonstrated good  understanding of the HEP provided. See EMR under Patient Instructions for exercises provided during therapy sessions.       Assessment     Morelia tolerated her session very well today with continued limitations in right hand range of motion extension to digits with resting ulnar deviation. Pt reporting some improved digit and wrist mobility following return to therapy from vacation.  Morelia is progressing towards her goals and there are no updates to goals at this time. Pt prognosis is Good. Pt will continue to benefit from skilled outpatient occupational therapy to address the deficits listed in the problem list on initial evaluation provide pt/family education and to maximize pt's level of independence in the home and community environment. Pt's spiritual, cultural and educational needs considered and pt agreeable to plan of care and goals.    Anticipated barriers to occupational therapy: none    Goals:   The following goals were discussed with the patient and patient is in agreement with them as to be addressed in the treatment plan.      Short Term Goals (STGs); to be met within 4 weeks.   STG #1: Pt will report 7 out of 10 pain level at worst In right upper extremity. (Progressing)   STG #2: Pt will demonstrate increased extension ROM of 10 degrees to all digits in order increase functional use of upper extremity (Progressing)  STG #3: Pt  will demonstrate increased  strength of 10#s in order to increase fine motor skills and dexterity. (Progressing)   STG #4: Pt will complete the 9 peg strength test in order to improve fine motor dexterity with reduction in 5 seconds. (MET)  STG #5: Pt will demonstrate independence with issued HEP. (MET)     Long Term Goals (LTGs); to be met by discharge.  LTG #1: Pt will report a pain level of 4 out of 10 at worst in right upper extremity. (Progressing)  LTG #2: Pt will demo improved FOTO score to predicted level. (MET)  LTG #3: Pt will return to prior level of function for ADL. (Progressing)  LTG #4: Pt will demonstrate increased  strength of 15#s in order to increase fine motor skills and dexterity.  LTG #5: Pt will demonstrate increased extension ROM of 15 degrees to all digits in order increase functional use of UE    PLAN   Plan of Care Certification: 6/12/2024 - 8/12/2024     Outpatient Occupational Therapy 1 - 2 times weekly for 8 weeks to include the following interventions: Paraffin, Fluidotherapy, Manual therapy/joint mobilizations, Modalities for pain management, US 3 mhz, Therapeutic exercises/activities., Strengthening, and Scar Management.       Nacho Castro OT

## 2024-06-20 ENCOUNTER — CLINICAL SUPPORT (OUTPATIENT)
Dept: REHABILITATION | Facility: HOSPITAL | Age: 49
End: 2024-06-20
Payer: MEDICAID

## 2024-06-20 DIAGNOSIS — R29.898 DECREASED GRIP STRENGTH: ICD-10-CM

## 2024-06-20 DIAGNOSIS — M79.641 HAND PAIN, RIGHT: Primary | ICD-10-CM

## 2024-06-20 PROCEDURE — 97530 THERAPEUTIC ACTIVITIES: CPT | Mod: PN

## 2024-06-20 NOTE — PROGRESS NOTES
[facility]  Occupational Therapy Treatment Note    Date: 6/20/2024  Name: Morelia Donis  Clinic Number: [id number]    Therapy Diagnosis:   Encounter Diagnoses   Name Primary?    Hand pain, right Yes    Decreased  strength        Physician: Pauly Rivera PA-C       Physician Orders: OT Evaluate and treat  Medical Diagnosis: right hand pain and flexion contracture of right fingers  Surgical Procedure and Date: Flexor tendon repair   Evaluation Date: 4/8/2024  Insurance Authorization Period Expiration: 4/11/2024 - 12/31/2024  Plan of Care Certification Period: 6/12/2024 - 8/12/2024  Progress Note Due: 7/13/2024  Date of Return to MD: 5/22/2024       Visit # / Visits authorized:    14 / 20      Precautions:  none    Time In: 10:15 pm  Time Out: 11:00 pm  Total Billable Time: 38 minutes    SUBJECTIVE     Pt reports: No new complaints   She was compliant with home exercise program given last session.     Response to previous treatment:       Pain: /10 at rest     /10 with functional use  Location: right arms, hands , and wrists      OBJECTIVE   Updated 6/14/2024    Active Range of Motion:  (Measured in degrees)    Right Right  Right    Wrist Extension  60 60 55   Wrist Flexion 80 85 85   Radial Deviation Neutral 15 30   Ulnar Deviation 60 (resting ulnar drift) 60 60      Range of Motion Hand  (Active/Passive)  Measured in degrees.    4/8/2024 6/14/2024     Right Right  Right            Digit 2:  MP  85/50 lag 90/15 90/0                 PIP     95/65 95/55 95/45                 DIP 40/neutral 45/0 40/0   Digit 3:  MP 85/50 lag 90/20 90/20                 PIP 90/50 90/50 95/40                 DIP 25/neutral 25/0 25/0   Digit 4:   MP 85/40 lag 85/20 85/20                  PIP 90/35 90/30 95/30                  DIP Neutral/neutral Neutral/0 25/0   Digit 5:  MP 85/25 lag 90/20 90/15                 PIP 65/35 70/30 70/30                 DIP 40/neutral 45/Nuetral 40/0      Manual  Muscle Test    Right Right  Right    Wrist Extension  5/5 5/5 5/5   Wrist Flexion 5/5 5/5 5/5   Radial Deviation 5/5 5/5 5/5   Ulnar Deviation 5/5 5/5 5/5          Strength (Dynamometer/Measured in pounds on Rung II) and Pinch Strength (Pinch Gauge)    4/8/2024 6/14/2024     Right Right  Right    Composite 20 25 20   Lateral Pinch 4 6 7   Tripod Pinch 2 5 6      Opposition (Fine Motor Skills/Dexterity):   9 hole              - right: 32.83 > 27.90 > 28.39        Treatment     Morelia received the treatments listed below:     Supervised modalities after being cleared for contradictions for 10 minutes to include:    -  Paraffin Wax with x 8 min to (R) wrist and hand to provided circumferential heat to improve soft tissue stretch ad pliability of joints with session.    Therapeutic Exericses to improve functional performance for  22 minutes to include:   - Passive extension to proximal interphalangeal and distal interphalangeal joints with gradual extension of metacarpal phalangeal joints in extension  - Red resistance bar 2 x 10 each   - supination   - pronation   - flexion   - extension  - Supraball squeeze: 2 x 30 reps with 2 second hold  - Lateral pinch supination with velcro board: 1 x 10   - Isometric thumb metacarpal phalangeal flexion and extension: 30 reps each    Pt received the following ultrasound modalities after being cleared for contraindication for 8 minutes (with setup):  Ultrasound  3.3MHz with 1.0 Wcm2 100%dutycycle  x 8 minutes with prep  (R) forearm extensors to increase circulation and muscle elasticity due to reported increased tightness and reduced overall hand movement since yesterday.  Patient Education and Home Exercises      Education provided:   - theraputty and wrist mobility exercises  - Progress towards goals     Written Home Exercises Provided: yes.  Exercises were reviewed and Morelia was able to demonstrate them prior to the end of the session.  Morelia demonstrated good   understanding of the HEP provided. See EMR under Patient Instructions for exercises provided during therapy sessions.       Assessment     Morelia tolerated her session very well today. Upon entry Pt reporting increased tightness to hand and forearm today since ~2 days ago. She says she noticed some increased tightness to her wrist extensors and has been experiencing increased difficulty extending fingers for functional use of hand. Following session and ultrasound application Pt reporting reduction in tightness and improved digit extension. Pt encouraged to wear extension splint during the day when resting. Pt prognosis is Good. Pt will continue to benefit from skilled outpatient occupational therapy to address the deficits listed in the problem list on initial evaluation provide pt/family education and to maximize pt's level of independence in the home and community environment. Pt's spiritual, cultural and educational needs considered and pt agreeable to plan of care and goals.    Anticipated barriers to occupational therapy: none    Goals:   The following goals were discussed with the patient and patient is in agreement with them as to be addressed in the treatment plan.      Short Term Goals (STGs); to be met within 4 weeks.   STG #1: Pt will report 7 out of 10 pain level at worst In right upper extremity. (Progressing)   STG #2: Pt will demonstrate increased extension ROM of 10 degrees to all digits in order increase functional use of upper extremity (Progressing)  STG #3: Pt will demonstrate increased  strength of 10#s in order to increase fine motor skills and dexterity. (Progressing)   STG #4: Pt will complete the 9 peg strength test in order to improve fine motor dexterity with reduction in 5 seconds. (MET)  STG #5: Pt will demonstrate independence with issued HEP. (MET)     Long Term Goals (LTGs); to be met by discharge.  LTG #1: Pt will report a pain level of 4 out of 10 at worst in right upper  extremity. (Progressing)  LTG #2: Pt will demo improved FOTO score to predicted level. (MET)  LTG #3: Pt will return to prior level of function for ADL. (Progressing)  LTG #4: Pt will demonstrate increased  strength of 15#s in order to increase fine motor skills and dexterity.  LTG #5: Pt will demonstrate increased extension ROM of 15 degrees to all digits in order increase functional use of UE    PLAN   Plan of Care Certification: 6/12/2024 - 8/12/2024     Outpatient Occupational Therapy 1 - 2 times weekly for 8 weeks to include the following interventions: Paraffin, Fluidotherapy, Manual therapy/joint mobilizations, Modalities for pain management, US 3 mhz, Therapeutic exercises/activities., Strengthening, and Scar Management.       Nacho Castro OT